# Patient Record
Sex: MALE | Race: WHITE | NOT HISPANIC OR LATINO | Employment: FULL TIME | URBAN - METROPOLITAN AREA
[De-identification: names, ages, dates, MRNs, and addresses within clinical notes are randomized per-mention and may not be internally consistent; named-entity substitution may affect disease eponyms.]

---

## 2023-03-06 ENCOUNTER — TELEPHONE (OUTPATIENT)
Dept: PRIMARY CARE | Facility: CLINIC | Age: 53
End: 2023-03-06
Payer: COMMERCIAL

## 2023-03-06 DIAGNOSIS — J32.9 SINUSITIS, UNSPECIFIED CHRONICITY, UNSPECIFIED LOCATION: ICD-10-CM

## 2023-03-06 RX ORDER — CEFUROXIME AXETIL 250 MG/1
250 TABLET ORAL 2 TIMES DAILY
Qty: 20 TABLET | Refills: 0 | Status: SHIPPED | OUTPATIENT
Start: 2023-03-06 | End: 2023-03-16

## 2023-03-06 NOTE — TELEPHONE ENCOUNTER
PATIENT IS CALLING - LAST SAW YOU ON 2/11/23 - FOR A SINUS INFECTION - IS EXPERIENCING THE SAME SYMPTOMS AGAIN, CONGESTION IS WORSE THIS TIME - WONDERING IF YOU COULD PRESCRIBE HIM THE SAME MEDS AGAIN?  NEED AN APPOINTMENT?  PLEASE ADVISE.    LAST HAD CEFUROXIME AXETIL 250 MG - 1 TABLET EVERY 12 HOURS    [facility] IN [place]

## 2023-04-06 DIAGNOSIS — F32.A DEPRESSION, UNSPECIFIED: ICD-10-CM

## 2023-04-06 DIAGNOSIS — F41.9 ANXIETY DISORDER, UNSPECIFIED: ICD-10-CM

## 2023-04-06 RX ORDER — VENLAFAXINE HYDROCHLORIDE 150 MG/1
CAPSULE, EXTENDED RELEASE ORAL
Qty: 90 CAPSULE | Refills: 0 | Status: SHIPPED | OUTPATIENT
Start: 2023-04-06 | End: 2023-07-05

## 2023-05-05 DIAGNOSIS — E78.5 HYPERLIPIDEMIA, UNSPECIFIED: ICD-10-CM

## 2023-05-05 RX ORDER — ROSUVASTATIN CALCIUM 10 MG/1
TABLET, COATED ORAL
Qty: 90 TABLET | Refills: 0 | Status: SHIPPED | OUTPATIENT
Start: 2023-05-05 | End: 2023-08-03

## 2023-05-23 ENCOUNTER — OFFICE VISIT (OUTPATIENT)
Dept: PRIMARY CARE | Facility: CLINIC | Age: 53
End: 2023-05-23
Payer: COMMERCIAL

## 2023-05-23 VITALS
HEART RATE: 79 BPM | DIASTOLIC BLOOD PRESSURE: 82 MMHG | SYSTOLIC BLOOD PRESSURE: 122 MMHG | OXYGEN SATURATION: 97 % | HEIGHT: 70 IN | BODY MASS INDEX: 29.89 KG/M2 | WEIGHT: 208.8 LBS | TEMPERATURE: 98 F

## 2023-05-23 DIAGNOSIS — Z12.5 PROSTATE CANCER SCREENING: ICD-10-CM

## 2023-05-23 DIAGNOSIS — R53.83 FATIGUE, UNSPECIFIED TYPE: ICD-10-CM

## 2023-05-23 DIAGNOSIS — E78.5 HYPERLIPIDEMIA, UNSPECIFIED HYPERLIPIDEMIA TYPE: ICD-10-CM

## 2023-05-23 DIAGNOSIS — R06.83 SNORING: ICD-10-CM

## 2023-05-23 DIAGNOSIS — Z00.00 ROUTINE GENERAL MEDICAL EXAMINATION AT A HEALTH CARE FACILITY: Primary | ICD-10-CM

## 2023-05-23 DIAGNOSIS — M65.352 TRIGGER LITTLE FINGER OF LEFT HAND: ICD-10-CM

## 2023-05-23 PROBLEM — F32.A ANXIETY AND DEPRESSION: Status: ACTIVE | Noted: 2023-05-23

## 2023-05-23 PROBLEM — L72.3 SEBACEOUS CYST: Status: ACTIVE | Noted: 2023-05-23

## 2023-05-23 PROBLEM — M79.601 PAIN OF RIGHT UPPER EXTREMITY: Status: ACTIVE | Noted: 2023-05-23

## 2023-05-23 PROBLEM — M77.8 TENDINITIS OF ELBOW: Status: ACTIVE | Noted: 2023-05-23

## 2023-05-23 PROBLEM — Z85.820 HISTORY OF MELANOMA: Status: ACTIVE | Noted: 2023-05-23

## 2023-05-23 PROBLEM — R22.2 LUMP OF SKIN OF BACK: Status: ACTIVE | Noted: 2023-05-23

## 2023-05-23 PROBLEM — M76.60 ACHILLES TENDON PAIN: Status: ACTIVE | Noted: 2023-05-23

## 2023-05-23 PROBLEM — M77.9 TENDONITIS: Status: ACTIVE | Noted: 2023-05-23

## 2023-05-23 PROBLEM — M77.50 TENDINITIS OF ANKLE: Status: ACTIVE | Noted: 2023-05-23

## 2023-05-23 PROBLEM — F41.9 ANXIETY AND DEPRESSION: Status: ACTIVE | Noted: 2023-05-23

## 2023-05-23 PROCEDURE — 99396 PREV VISIT EST AGE 40-64: CPT | Performed by: FAMILY MEDICINE

## 2023-05-23 PROCEDURE — 1036F TOBACCO NON-USER: CPT | Performed by: FAMILY MEDICINE

## 2023-05-23 RX ORDER — TADALAFIL 20 MG/1
TABLET ORAL
COMMUNITY
Start: 2023-02-11 | End: 2024-01-15

## 2023-05-23 RX ORDER — DICLOFENAC SODIUM 75 MG/1
75 TABLET, DELAYED RELEASE ORAL 2 TIMES DAILY PRN
Qty: 60 TABLET | Refills: 3 | Status: SHIPPED | OUTPATIENT
Start: 2023-05-23 | End: 2023-11-22

## 2023-05-23 RX ORDER — BUSPIRONE HYDROCHLORIDE 10 MG/1
10 TABLET ORAL DAILY
COMMUNITY
Start: 2022-01-17 | End: 2023-11-09

## 2023-05-23 NOTE — PROGRESS NOTES
"Subjective   Patient ID: Alphonso King is a 52 y.o. male who presents for Snoring and Joint Pain.  HPI  Snoring   Worsening over the past 2-3 months  Admits to fatigue   No feelings of drowsiness when driving   Has a family h/o sleep apnea     Joint pain   Left fingers x 3 weeks   Described as an aching   Also admits to pain in the left foot   No other joint pain noted   Denies a specific injury  No otc meds taken                ROS  Constitutional- No activity change. No appetite change.  Eyes- Denies vision changes.  Respiratory- No shortness of breath.  Cardiovascular- No palpitations. No chest pain.  GI- No nausea or vomiting. No diarrhea or constipation. Denies abdominal pain.  Musculoskeletal- Denies joint swelling.  Extremities- No edema.  Neurological- Denies headaches. Denies dizziness.  Skin- No rashes.  Psychiatric/Behavioral- Denies significant anxiety, or depressed mood.     Objective     /82   Pulse 79   Temp 36.7 °C (98 °F) (Oral)   Ht 1.778 m (5' 10\")   Wt 94.7 kg (208 lb 12.8 oz)   SpO2 97%   BMI 29.96 kg/m²     No Known Allergies    Constitutional-- Well-nourished.  No distress  Head- unremarkable.  Ears- TMs clear.  Eyes- PERRL.  Conjunctiva normal.  Nose- Normal.  No rhinorrhea noted.  Throat- Oropharynx is clear and moist.  Neck- Supple with no thyromegaly.  No significant cervical adenopathy noted.  Pulmonary/Chest- Breath sounds normal with normal effort.  No wheezing.  Heart- Regular rate and rhythm.  No murmur.  Abdomen- Soft and non-tender.  No masses noted.  Musculoskeletal- Normal ROM.  No significant joint swelling  Extremities- No edema.   Neurological- Alert.  No noted deficits.  Skin- Warm.  No rashes.  Psychiatric/Behavioral- Mood and affect normal.  Behavior normal.     Assessment/Plan   1. Routine general medical examination at a health care facility  CBC and Auto Differential    Comprehensive Metabolic Panel    Lipid Panel      2. Hyperlipidemia, unspecified " hyperlipidemia type  Lipid Panel      3. Prostate cancer screening  Prostate Specific Antigen, Screen      4. Snoring  Home sleep apnea test (HSAT)      5. Fatigue, unspecified type  Home sleep apnea test (HSAT)      6. Trigger little finger of left hand  diclofenac (Voltaren) 75 mg EC tablet         Long talk. Treatment options reviewed.  Mood stable  BP controlled  Cholesterol moderately controlled    Snoring:  Ordered home based sleep study  Labs as ordered     Finger pain likely trigger finger vs arthritis:  Start diclofenac 75 mg as needed.  Start over the counter pain controled medications as needed for pain relief     Refilled medications as requested/required   Continue and take your medications as prescribed.  Take the least amount of medication required for symptom control     Health Maintenance issues discussed.    Importance of healthy diet and regular exercise regimen discussed.    We will contact you with any test results ordered. If you do not hear from us, please contact.    Follow-up as instructed or sooner if any problems or symptoms do not resolve as expected.    Scribe Attestation  By signing my name below, HAMILTON ElioKori Carballo   attest that this documentation has been prepared under the direction and in the presence of Tae Aguilar MD.

## 2023-07-04 DIAGNOSIS — F32.A DEPRESSION, UNSPECIFIED: ICD-10-CM

## 2023-07-04 DIAGNOSIS — F41.9 ANXIETY DISORDER, UNSPECIFIED: ICD-10-CM

## 2023-07-05 RX ORDER — VENLAFAXINE HYDROCHLORIDE 150 MG/1
CAPSULE, EXTENDED RELEASE ORAL
Qty: 90 CAPSULE | Refills: 1 | Status: SHIPPED | OUTPATIENT
Start: 2023-07-05 | End: 2024-01-02

## 2023-08-03 DIAGNOSIS — E78.5 HYPERLIPIDEMIA, UNSPECIFIED: ICD-10-CM

## 2023-08-03 RX ORDER — ROSUVASTATIN CALCIUM 10 MG/1
TABLET, COATED ORAL
Qty: 90 TABLET | Refills: 0 | Status: SHIPPED | OUTPATIENT
Start: 2023-08-03 | End: 2023-11-06

## 2023-08-08 ENCOUNTER — APPOINTMENT (OUTPATIENT)
Dept: PRIMARY CARE | Facility: CLINIC | Age: 53
End: 2023-08-08
Payer: COMMERCIAL

## 2023-08-16 ENCOUNTER — APPOINTMENT (OUTPATIENT)
Dept: PRIMARY CARE | Facility: CLINIC | Age: 53
End: 2023-08-16
Payer: COMMERCIAL

## 2023-10-23 ENCOUNTER — OFFICE VISIT (OUTPATIENT)
Dept: PRIMARY CARE | Facility: CLINIC | Age: 53
End: 2023-10-23
Payer: COMMERCIAL

## 2023-10-23 VITALS
SYSTOLIC BLOOD PRESSURE: 130 MMHG | HEART RATE: 80 BPM | RESPIRATION RATE: 18 BRPM | BODY MASS INDEX: 30.29 KG/M2 | WEIGHT: 211.6 LBS | HEIGHT: 70 IN | OXYGEN SATURATION: 98 % | TEMPERATURE: 97.8 F | DIASTOLIC BLOOD PRESSURE: 90 MMHG

## 2023-10-23 DIAGNOSIS — R61 PERSPIRATION EXCESSIVE: ICD-10-CM

## 2023-10-23 DIAGNOSIS — R06.02 SOB (SHORTNESS OF BREATH) ON EXERTION: Primary | ICD-10-CM

## 2023-10-23 DIAGNOSIS — E55.9 VITAMIN D DEFICIENCY: ICD-10-CM

## 2023-10-23 DIAGNOSIS — F32.A ANXIETY AND DEPRESSION: ICD-10-CM

## 2023-10-23 DIAGNOSIS — E78.5 HYPERLIPIDEMIA, UNSPECIFIED HYPERLIPIDEMIA TYPE: ICD-10-CM

## 2023-10-23 DIAGNOSIS — F41.9 ANXIETY AND DEPRESSION: ICD-10-CM

## 2023-10-23 PROCEDURE — 99213 OFFICE O/P EST LOW 20 MIN: CPT | Performed by: FAMILY MEDICINE

## 2023-10-23 PROCEDURE — 93000 ELECTROCARDIOGRAM COMPLETE: CPT | Performed by: FAMILY MEDICINE

## 2023-10-23 PROCEDURE — 1036F TOBACCO NON-USER: CPT | Performed by: FAMILY MEDICINE

## 2023-10-23 NOTE — PROGRESS NOTES
Subjective   Patient ID: Alphonso King is a 53 y.o. male who presents for Excessive Sweating, Shortness of Breath, and memory issues.  HPI    Hyperlipidemia follow up  Denies chest pain, swelling, headaches, lightheadedness or dizziness.   Eats a generally healthy diet, Exercises.   Currently taking rosuvastatin    Anxiety and depression follow up  Taking the Effexor   Working well   50 % effective   Denies any side effects   Last took this morning    Patient presents in office today for SOB. admits that this happens when he is exercising. Admits that he finds it hard to catch his breath. Ongoing x 1 year. Admits that he is an ex smoker. Admits that he feels like he is running out of energy faster when this happens.     Also presents in office today for excessive sweating. Ongoing for years. Admits that his mother and his sister have this issues as well. Admits that this is worse in the morning. admits that his hair will be soaked as if he just got out of the shower.     Also presents in office today for memory issues. Admits that he has been having issues with remembering names, admits that this happened with a  he had yesterday for his child. Admits that he did forget her name, but this was the first time that he had this . Admits to a family history of Dementia, alzheimer's.     Taking current medications which were reviewed.  Problem list discussed.    Overall doing well.  Eating okay.  Staying active.    Has no other new problem /question.     ROS  Constitutional- No activity change. No appetite change.  Eyes- Denies vision changes.  Respiratory- shortness of breath  Cardiovascular- No palpitations. No chest pain.  GI- No nausea or vomiting. No diarrhea or constipation. Denies abdominal pain.  Musculoskeletal- Denies joint swelling.  Extremities- No edema.  Neurological- Denies headaches. Denies dizziness.  Skin- No rashes.  Psychiatric/Behavioral- Denies significant anxiety, or depressed  "mood.     Objective     /90   Pulse 80   Temp 36.6 °C (97.8 °F) (Temporal)   Resp 18   Ht 1.778 m (5' 10\")   Wt 96 kg (211 lb 9.6 oz)   SpO2 98%   BMI 30.36 kg/m²     No Known Allergies    Constitutional-- Well-nourished.  No distress  Head- unremarkable.  Eyes- PERRL.  Conjunctiva normal.  Nose- Normal.  No rhinorrhea noted.  Throat- Oropharynx is clear and moist.  Neck- Supple with no thyromegaly.  No significant cervical adenopathy noted.  Pulmonary/Chest- Breath sounds normal with normal effort.  No wheezing.  Heart- Regular rate and rhythm.  No murmur.  Abdomen- Soft and non-tender.  No masses noted.  Musculoskeletal- Normal ROM.  No significant joint swelling  Extremities- No edema.   Neurological- Alert.  No noted deficits.  Mini-Mental status exam within normal limits  Skin- Warm.  No rashes.  Psychiatric/Behavioral- Mood and affect normal.  Behavior normal.       EKG normal sinus rhythm with no acute changes    Assessment/Plan   1. SOB (shortness of breath) on exertion  Thyroid Stimulating Hormone    Thyroxine, Free    Vitamin D 25-Hydroxy,Total (for eval of Vitamin D levels)    ECG 12 Lead    Referral to Cardiology      2. Anxiety and depression  Thyroid Stimulating Hormone    Thyroxine, Free    Vitamin D 25-Hydroxy,Total (for eval of Vitamin D levels)      3. Perspiration excessive        4. Hyperlipidemia, unspecified hyperlipidemia type        5. Vitamin D deficiency  Vitamin D 25-Hydroxy,Total (for eval of Vitamin D levels)             Long talk. Treatment options reviewed.    Discussed shortness of breath.  Discussed excessive perspiration.    Given his concern with shortness of breath on exertion will refer to cardiology for evaluation and work-up.    Continue current medication. I have counseled the patient on anxiety and depression. Denies suicidal ideation or homicidal ideation.     Educated on vitamin deficiencies.      Continue and take your medications as prescribed.    Health " Maintenance issues discussed.    Importance of healthy diet and regular exercise regimen discussed.    We will contact you with any test results ordered. If you do not hear from us, please contact.    Follow-up as instructed or sooner if any problems or symptoms do not resolve as expected.    Scribe Attestation  By signing my name below, Nubia VILLASENOR Scribe   attest that this documentation has been prepared under the direction and in the presence of Tae Aguilar MD.

## 2023-10-31 ENCOUNTER — HOSPITAL ENCOUNTER (EMERGENCY)
Facility: HOSPITAL | Age: 53
Discharge: HOME | End: 2023-10-31
Attending: EMERGENCY MEDICINE
Payer: COMMERCIAL

## 2023-10-31 ENCOUNTER — APPOINTMENT (OUTPATIENT)
Dept: RADIOLOGY | Facility: HOSPITAL | Age: 53
End: 2023-10-31
Payer: COMMERCIAL

## 2023-10-31 VITALS
OXYGEN SATURATION: 98 % | DIASTOLIC BLOOD PRESSURE: 112 MMHG | BODY MASS INDEX: 30.35 KG/M2 | RESPIRATION RATE: 16 BRPM | HEART RATE: 78 BPM | WEIGHT: 212 LBS | SYSTOLIC BLOOD PRESSURE: 155 MMHG | TEMPERATURE: 97.9 F | HEIGHT: 70 IN

## 2023-10-31 DIAGNOSIS — R07.9 CHEST PAIN, UNSPECIFIED TYPE: Primary | ICD-10-CM

## 2023-10-31 LAB
ALBUMIN SERPL BCP-MCNC: 4.6 G/DL (ref 3.4–5)
ALP SERPL-CCNC: 39 U/L (ref 33–120)
ALT SERPL W P-5'-P-CCNC: 43 U/L (ref 10–52)
ANION GAP SERPL CALC-SCNC: 13 MMOL/L (ref 10–20)
AST SERPL W P-5'-P-CCNC: 26 U/L (ref 9–39)
BASOPHILS # BLD AUTO: 0.06 X10*3/UL (ref 0–0.1)
BASOPHILS NFR BLD AUTO: 0.8 %
BILIRUB SERPL-MCNC: 0.4 MG/DL (ref 0–1.2)
BUN SERPL-MCNC: 18 MG/DL (ref 6–23)
CALCIUM SERPL-MCNC: 9.5 MG/DL (ref 8.6–10.3)
CARDIAC TROPONIN I PNL SERPL HS: 4 NG/L (ref 0–20)
CARDIAC TROPONIN I PNL SERPL HS: 5 NG/L (ref 0–20)
CHLORIDE SERPL-SCNC: 103 MMOL/L (ref 98–107)
CO2 SERPL-SCNC: 26 MMOL/L (ref 21–32)
CREAT SERPL-MCNC: 1.13 MG/DL (ref 0.5–1.3)
D DIMER PPP FEU-MCNC: <215 NG/ML FEU
EOSINOPHIL # BLD AUTO: 0.14 X10*3/UL (ref 0–0.7)
EOSINOPHIL NFR BLD AUTO: 1.9 %
ERYTHROCYTE [DISTWIDTH] IN BLOOD BY AUTOMATED COUNT: 13.4 % (ref 11.5–14.5)
GFR SERPL CREATININE-BSD FRML MDRD: 78 ML/MIN/1.73M*2
GLUCOSE SERPL-MCNC: 119 MG/DL (ref 74–99)
HCT VFR BLD AUTO: 46.2 % (ref 41–52)
HGB BLD-MCNC: 15.1 G/DL (ref 13.5–17.5)
IMM GRANULOCYTES # BLD AUTO: 0.02 X10*3/UL (ref 0–0.7)
IMM GRANULOCYTES NFR BLD AUTO: 0.3 % (ref 0–0.9)
LYMPHOCYTES # BLD AUTO: 1.98 X10*3/UL (ref 1.2–4.8)
LYMPHOCYTES NFR BLD AUTO: 26.9 %
MAGNESIUM SERPL-MCNC: 2.34 MG/DL (ref 1.6–2.4)
MCH RBC QN AUTO: 29.3 PG (ref 26–34)
MCHC RBC AUTO-ENTMCNC: 32.7 G/DL (ref 32–36)
MCV RBC AUTO: 90 FL (ref 80–100)
MONOCYTES # BLD AUTO: 0.41 X10*3/UL (ref 0.1–1)
MONOCYTES NFR BLD AUTO: 5.6 %
NEUTROPHILS # BLD AUTO: 4.75 X10*3/UL (ref 1.2–7.7)
NEUTROPHILS NFR BLD AUTO: 64.5 %
NRBC BLD-RTO: 0 /100 WBCS (ref 0–0)
PLATELET # BLD AUTO: 221 X10*3/UL (ref 150–450)
PMV BLD AUTO: 9.2 FL (ref 7.5–11.5)
POTASSIUM SERPL-SCNC: 3.9 MMOL/L (ref 3.5–5.3)
PROT SERPL-MCNC: 7.3 G/DL (ref 6.4–8.2)
RBC # BLD AUTO: 5.16 X10*6/UL (ref 4.5–5.9)
SODIUM SERPL-SCNC: 138 MMOL/L (ref 136–145)
WBC # BLD AUTO: 7.4 X10*3/UL (ref 4.4–11.3)

## 2023-10-31 PROCEDURE — 99284 EMERGENCY DEPT VISIT MOD MDM: CPT | Mod: 25 | Performed by: EMERGENCY MEDICINE

## 2023-10-31 PROCEDURE — 71046 X-RAY EXAM CHEST 2 VIEWS: CPT | Performed by: RADIOLOGY

## 2023-10-31 PROCEDURE — 99285 EMERGENCY DEPT VISIT HI MDM: CPT | Performed by: EMERGENCY MEDICINE

## 2023-10-31 PROCEDURE — 80053 COMPREHEN METABOLIC PANEL: CPT | Performed by: EMERGENCY MEDICINE

## 2023-10-31 PROCEDURE — 36415 COLL VENOUS BLD VENIPUNCTURE: CPT | Performed by: EMERGENCY MEDICINE

## 2023-10-31 PROCEDURE — 84484 ASSAY OF TROPONIN QUANT: CPT | Performed by: EMERGENCY MEDICINE

## 2023-10-31 PROCEDURE — 85025 COMPLETE CBC W/AUTO DIFF WBC: CPT | Performed by: EMERGENCY MEDICINE

## 2023-10-31 PROCEDURE — 93010 ELECTROCARDIOGRAM REPORT: CPT | Performed by: EMERGENCY MEDICINE

## 2023-10-31 PROCEDURE — 83735 ASSAY OF MAGNESIUM: CPT | Performed by: EMERGENCY MEDICINE

## 2023-10-31 PROCEDURE — 99283 EMERGENCY DEPT VISIT LOW MDM: CPT | Mod: 25

## 2023-10-31 PROCEDURE — 94760 N-INVAS EAR/PLS OXIMETRY 1: CPT

## 2023-10-31 PROCEDURE — 71046 X-RAY EXAM CHEST 2 VIEWS: CPT | Mod: FY

## 2023-10-31 PROCEDURE — 85379 FIBRIN DEGRADATION QUANT: CPT | Performed by: EMERGENCY MEDICINE

## 2023-10-31 ASSESSMENT — HEART SCORE
HISTORY: SLIGHTLY SUSPICIOUS
AGE: 45-64
ECG: NORMAL
RISK FACTORS: >2 RISK FACTORS OR HX OF ATHEROSCLEROTIC DISEASE
TROPONIN: LESS THAN OR EQUAL TO NORMAL LIMIT
HEART SCORE: 3

## 2023-10-31 ASSESSMENT — COLUMBIA-SUICIDE SEVERITY RATING SCALE - C-SSRS
2. HAVE YOU ACTUALLY HAD ANY THOUGHTS OF KILLING YOURSELF?: NO
6. HAVE YOU EVER DONE ANYTHING, STARTED TO DO ANYTHING, OR PREPARED TO DO ANYTHING TO END YOUR LIFE?: NO
1. IN THE PAST MONTH, HAVE YOU WISHED YOU WERE DEAD OR WISHED YOU COULD GO TO SLEEP AND NOT WAKE UP?: NO

## 2023-10-31 ASSESSMENT — PAIN DESCRIPTION - DESCRIPTORS: DESCRIPTORS: ACHING;DISCOMFORT

## 2023-10-31 ASSESSMENT — LIFESTYLE VARIABLES
HAVE YOU EVER FELT YOU SHOULD CUT DOWN ON YOUR DRINKING: NO
REASON UNABLE TO ASSESS: NO
EVER HAD A DRINK FIRST THING IN THE MORNING TO STEADY YOUR NERVES TO GET RID OF A HANGOVER: NO
EVER FELT BAD OR GUILTY ABOUT YOUR DRINKING: NO
HAVE PEOPLE ANNOYED YOU BY CRITICIZING YOUR DRINKING: NO

## 2023-10-31 ASSESSMENT — PAIN SCALES - GENERAL
PAINLEVEL_OUTOF10: 4
PAINLEVEL_OUTOF10: 0 - NO PAIN
PAINLEVEL_OUTOF10: 1

## 2023-10-31 ASSESSMENT — PAIN - FUNCTIONAL ASSESSMENT: PAIN_FUNCTIONAL_ASSESSMENT: 0-10

## 2023-10-31 NOTE — ED PROVIDER NOTES
HPI   Chief Complaint   Patient presents with    Chest Pain       53-year-old male with past medical history of hyperlipidemia, anxiety and depression presenting to the ED for chest discomfort.  Patient states he woke around 4 AM with a pressure sensation but was able to fall back asleep.  He woke again a few hours later and was still experiencing chest pressure that is nonradiating.  Denies shortness of breath, nausea, vomiting, diaphoresis, leg pain or swelling, fevers, cough.  He endorses nonbloody diarrhea with a couple episodes since yesterday.  He also endorses recent long distance car travel about 2 weeks prior.  Patient states he is currently experiencing stressors at home with his marriage and had an argument last night.      History provided by:  Patient                      [place] Coma Scale Score: 15   HEART Score: 3                Patient History   Past Medical History:   Diagnosis Date    Encounter for general adult medical examination without abnormal findings 01/17/2022    Annual physical exam    Encounter for screening for malignant neoplasm of prostate 01/17/2022    Screening PSA (prostate specific antigen)    Personal history of malignant melanoma of skin     History of malignant melanoma of skin     Past Surgical History:   Procedure Laterality Date    OTHER SURGICAL HISTORY  01/17/2022    Eye surgery     Family History   Problem Relation Name Age of Onset    Skin cancer Maternal Grandmother      Heart attack Paternal Grandfather       Social History     Tobacco Use    Smoking status: Former     Types: Cigarettes    Smokeless tobacco: Never   Substance Use Topics    Alcohol use: Yes     Comment: 2 x per week    Drug use: Not on file       Physical Exam   ED Triage Vitals [10/31/23 1251]   Temp Heart Rate Resp BP   36.6 °C (97.9 °F) 91 18 (!) 169/98      SpO2 Temp Source Heart Rate Source Patient Position   99 % Temporal Monitor Sitting      BP Location FiO2 (%)     Right arm --       Physical  Exam  Vitals and nursing note reviewed.   Constitutional:       General: He is not in acute distress.     Appearance: He is not toxic-appearing.   HENT:      Head: Normocephalic and atraumatic.      Nose: Nose normal.      Mouth/Throat:      Mouth: Mucous membranes are moist.   Eyes:      Extraocular Movements: Extraocular movements intact.      Conjunctiva/sclera: Conjunctivae normal.   Cardiovascular:      Rate and Rhythm: Normal rate and regular rhythm.      Pulses: Normal pulses.           Radial pulses are 2+ on the right side and 2+ on the left side.        Dorsalis pedis pulses are 2+ on the right side and 2+ on the left side.      Heart sounds: Normal heart sounds.   Pulmonary:      Effort: Pulmonary effort is normal.      Breath sounds: Normal breath sounds.   Abdominal:      General: There is no distension.      Palpations: Abdomen is soft.      Tenderness: There is no abdominal tenderness.   Musculoskeletal:         General: Normal range of motion.      Cervical back: Normal range of motion and neck supple.      Right lower leg: No edema.      Left lower leg: No edema.   Skin:     General: Skin is warm and dry.      Capillary Refill: Capillary refill takes less than 2 seconds.   Neurological:      General: No focal deficit present.      Mental Status: He is alert. Mental status is at baseline.         ED Course & MDM   Diagnoses as of 10/31/23 1641   Chest pain, unspecified type       Medical Decision Making  53-year-old male the past medical history of hyperlipidemia, anxiety and depression presenting to the ED for chest pain that started today without other associated symptoms.  Patient is nontoxic-appearing on exam.  He has equal pulses in all extremities.    Cardiac labs were obtained with normal cardiac enzymes x2 and nonischemic EKG.  D-dimer was negative therefore low suspicion for PE.  Normal electrolytes and renal function.  Chest x-ray without consolidations, widened mediastinum or  cardiomegaly.    Patient has an appointment to see cardiology and will follow-up at that time.  Heart score is 3.  Believe he is appropriate to be discharged home and was comfortable with this plan.  He will return to the ED for any worsening symptoms.    Amount and/or Complexity of Data Reviewed  Labs: ordered. Decision-making details documented in ED Course.  Radiology: ordered and independent interpretation performed. Decision-making details documented in ED Course.  ECG/medicine tests: ordered and independent interpretation performed. Decision-making details documented in ED Course.     Details: Normal sinus rhythm with rate of 71.  QTc 473.  Normal axis.  No acute ischemic pattern.        Procedure  Procedures     Don Roblero DO  Resident  10/31/23 3468

## 2023-10-31 NOTE — DISCHARGE INSTRUCTIONS
Follow-up with your primary care physician and your cardiologist as scheduled.  If you have worsening symptoms please return to the ER for evaluation.

## 2023-11-01 ENCOUNTER — HOSPITAL ENCOUNTER (OUTPATIENT)
Dept: CARDIOLOGY | Facility: HOSPITAL | Age: 53
Discharge: HOME | End: 2023-11-01
Payer: COMMERCIAL

## 2023-11-01 LAB
ATRIAL RATE: 71 BPM
P AXIS: 62 DEGREES
P OFFSET: 199 MS
P ONSET: 141 MS
PR INTERVAL: 162 MS
Q ONSET: 222 MS
QRS COUNT: 12 BEATS
QRS DURATION: 94 MS
QT INTERVAL: 436 MS
QTC CALCULATION(BAZETT): 473 MS
QTC FREDERICIA: 461 MS
R AXIS: 29 DEGREES
T AXIS: 59 DEGREES
T OFFSET: 440 MS
VENTRICULAR RATE: 71 BPM

## 2023-11-01 PROCEDURE — 93005 ELECTROCARDIOGRAM TRACING: CPT

## 2023-11-05 DIAGNOSIS — E78.5 HYPERLIPIDEMIA, UNSPECIFIED: ICD-10-CM

## 2023-11-06 RX ORDER — ROSUVASTATIN CALCIUM 10 MG/1
TABLET, COATED ORAL
Qty: 90 TABLET | Refills: 1 | Status: SHIPPED | OUTPATIENT
Start: 2023-11-06 | End: 2023-11-22 | Stop reason: DRUGHIGH

## 2023-11-22 ENCOUNTER — OFFICE VISIT (OUTPATIENT)
Dept: CARDIOLOGY | Facility: CLINIC | Age: 53
End: 2023-11-22
Payer: COMMERCIAL

## 2023-11-22 VITALS
SYSTOLIC BLOOD PRESSURE: 155 MMHG | WEIGHT: 210 LBS | HEIGHT: 70 IN | DIASTOLIC BLOOD PRESSURE: 107 MMHG | HEART RATE: 49 BPM | BODY MASS INDEX: 30.06 KG/M2

## 2023-11-22 DIAGNOSIS — E78.2 MIXED HYPERLIPIDEMIA: Primary | ICD-10-CM

## 2023-11-22 DIAGNOSIS — F32.A ANXIETY AND DEPRESSION: ICD-10-CM

## 2023-11-22 DIAGNOSIS — Z82.49 FAMILY HISTORY OF PREMATURE CORONARY ARTERY DISEASE: ICD-10-CM

## 2023-11-22 DIAGNOSIS — R07.9 CHEST PAIN, UNSPECIFIED TYPE: ICD-10-CM

## 2023-11-22 DIAGNOSIS — R06.02 SOB (SHORTNESS OF BREATH) ON EXERTION: ICD-10-CM

## 2023-11-22 DIAGNOSIS — F41.9 ANXIETY AND DEPRESSION: ICD-10-CM

## 2023-11-22 PROBLEM — R07.89 OTHER CHEST PAIN: Status: ACTIVE | Noted: 2023-11-22

## 2023-11-22 PROCEDURE — 99204 OFFICE O/P NEW MOD 45 MIN: CPT | Performed by: INTERNAL MEDICINE

## 2023-11-22 PROCEDURE — 1036F TOBACCO NON-USER: CPT | Performed by: INTERNAL MEDICINE

## 2023-11-22 RX ORDER — ASPIRIN 81 MG/1
81 TABLET ORAL DAILY
Qty: 90 TABLET | Refills: 3 | Status: SHIPPED | OUTPATIENT
Start: 2023-11-22 | End: 2024-11-21

## 2023-11-22 RX ORDER — DICLOFENAC SODIUM 75 MG/1
75 TABLET, DELAYED RELEASE ORAL NIGHTLY PRN
COMMUNITY
End: 2023-11-29 | Stop reason: SDUPTHER

## 2023-11-22 RX ORDER — ROSUVASTATIN CALCIUM 40 MG/1
40 TABLET, COATED ORAL DAILY
Qty: 30 TABLET | Refills: 11 | Status: SHIPPED | OUTPATIENT
Start: 2023-11-22 | End: 2024-01-15 | Stop reason: SDUPTHER

## 2023-11-22 RX ORDER — NITROGLYCERIN 0.4 MG/1
0.4 TABLET SUBLINGUAL EVERY 5 MIN PRN
Qty: 100 TABLET | Refills: 11 | Status: CANCELLED | OUTPATIENT
Start: 2023-11-22

## 2023-11-22 NOTE — PROGRESS NOTES
CARDIOLOGY NEW PATIENT OFFICE VISIT    Patient:  Alphonso King  YOB: 1970  MRN: [id number]       History of Present Illness:     Alphonso King is a 53 y.o. male who is being seen today at the request of Dr. Tae Aguilar for evaluation of cardiac status in the setting of chest discomfort.  He does not have any history of atherosclerotic heart or valvular heart disease.  He has a history of significant mixed hyperlipidemia.  No history of hypertension or diabetes mellitus.  He stopped smoking tobacco several years ago.  He relates a strongly positive family history for premature coronary artery disease.  His father had a myocardial infarction in his 40s.  His paternal grandparents also had myocardial infarctions at relatively young ages.      Patient states he was doing well until the morning of October 31, 2023.  He was awakened at AM with a strong pressure-like discomfort in his chest.  After several minutes he was able to fall back asleep.  A few hours later he woke up and still noted nonradiating pressure discomfort in his left upper chest.  No associated nausea, vomiting, diaphoresis, or shortness of breath.  The patient notes that he had been under quite a bit of stress at home.  He and his wife had an argument the evening before the ED visit.  His symptoms resolved spontaneously.  Cardiac enzymes were negative.  D-dimer was negative.  CBC and CMP were normal.  Cholesterol was 263 with triglycerides 430, and HDL 41.  LDL unable to be calculated.  EKG did not show any significant ST changes.  Chest x-ray did not show any active disease.  He was discharged home with plans to follow-up with cardiology.      He has been doing reasonably well since his discharge.  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  He admittedly has quite a bit of stress and anxiety.  He denies any recurrent episodes of chest discomfort.  He notes some concern because of some gradual worsening exertional  shortness of breath.  He denies any orthopnea, PND, or increasing peripheral edema.  He denies any palpitations, lightheadedness, near-syncope, or syncope.  He denies any fever, chills, or cough.  He denies any nausea, vomiting, or diaphoresis.  He denies any hemoptysis, hematemesis, melena, or hematochezia.    I spoke at length with Alphonso regarding his symptoms of chest discomfort.  He has significant risk for obstructive coronary disease secondary to quite severe mixed hyperlipidemia and strongly positive family history for premature coronary disease.  I suggested we proceed with a coronary CT angiogram in the near future.  Further recommendations pending these results.  I advised him to increase rosuvastatin to 40 mg daily.  He was advised for now to start on aspirin 81 mg daily.  Would avoid SL NTG in the setting of using sildenafil.        Allergies:     No Known Allergies       Outpatient Medications:     Current Outpatient Medications   Medication Instructions    busPIRone (Buspar) 10 mg tablet TAKE 1-2 TABLETS BY MOUTH DAILY    diclofenac (VOLTAREN) 75 mg, oral, Nightly PRN, Do not crush, chew, or split.    rosuvastatin (Crestor) 10 mg tablet TAKE 1 TABLET BY MOUTH EVERYDAY AT BEDTIME    tadalafil 20 mg tablet TAKE 1 TABLET BY MOUTH 1 HOUR BEFORE ACTIVITY AS NEEDED    venlafaxine XR (Effexor-XR) 150 mg 24 hr capsule TAKE 1 CAPSULE BY MOUTH EVERY DAY        Past Medical History:     Past Medical History:   Diagnosis Date    Encounter for general adult medical examination without abnormal findings 01/17/2022    Annual physical exam    Encounter for screening for malignant neoplasm of prostate 01/17/2022    Screening PSA (prostate specific antigen)    Personal history of malignant melanoma of skin     History of malignant melanoma of skin       Social History:     Social History     Tobacco Use    Smoking status: Former     Types: Cigarettes    Smokeless tobacco: Never   Substance Use Topics    Alcohol use: Yes      Comment: 2 x per week       Family History:     Family History   Problem Relation Name Age of Onset    Skin cancer Maternal Grandmother      Heart attack Paternal Grandfather         Review of Systems:     12 point review of systems completed and is negative other than that detailed above.       Objective:     Vitals:    11/22/23 1433   BP: (!) 155/107   Pulse: (!) 49       Wt Readings from Last 4 Encounters:   11/22/23 95.3 kg (210 lb)   10/31/23 96.2 kg (212 lb)   10/23/23 96 kg (211 lb 9.6 oz)   05/23/23 94.7 kg (208 lb 12.8 oz)       Physical Examination:   GENERAL:  Well developed, well nourished, in no acute distress.  CHEST:  Symmetric and nontender.  NEURO/PSYCH:  Alert and oriented times three with approppriate behavior and responses.  NECK:  Supple, no JVD, no bruit.  LUNGS:  Clear to auscultation bilaterally, normal respiratory effort.  HEART:  Rate and rhythm regular with no evident murmur, no gallop appreciated.        There are no rubs, clicks or heaves.  EXTREMITIES:  Warm with good color, no clubbing or cyanosis.  There is no edema noted.  PERIPHERAL VASCULAR:  Pulses present and equally palpable; 2+ throughout.      Lab:     CBC:   Lab Results   Component Value Date    WBC 7.4 10/31/2023    RBC 5.16 10/31/2023    HGB 15.1 10/31/2023    HCT 46.2 10/31/2023     10/31/2023        CMP:    Lab Results   Component Value Date     10/31/2023    K 3.9 10/31/2023     10/31/2023    CO2 26 10/31/2023    BUN 18 10/31/2023    CREATININE 1.13 10/31/2023    GLUCOSE 119 (H) 10/31/2023    CALCIUM 9.5 10/31/2023       Magnesium:    Lab Results   Component Value Date    MG 2.34 10/31/2023       Lipid Profile:    Lab Results   Component Value Date    TRIG 430 (H) 04/12/2022    HDL 41.0 04/12/2022       BMP:  Lab Results   Component Value Date     10/31/2023     04/12/2022    K 3.9 10/31/2023    K 4.2 04/12/2022     10/31/2023     04/12/2022    CO2 26 10/31/2023    CO2 30  04/12/2022    BUN 18 10/31/2023    BUN 15 04/12/2022    CREATININE 1.13 10/31/2023    CREATININE 1.16 04/12/2022       CBC:  Lab Results   Component Value Date    WBC 7.4 10/31/2023    WBC 7.1 04/12/2022    RBC 5.16 10/31/2023    RBC 5.44 04/12/2022    HGB 15.1 10/31/2023    HGB 15.8 04/12/2022    HCT 46.2 10/31/2023    HCT 49.3 04/12/2022    MCV 90 10/31/2023    MCV 91 04/12/2022    MCH 29.3 10/31/2023    MCHC 32.7 10/31/2023    MCHC 32.0 04/12/2022    RDW 13.4 10/31/2023    RDW 13.2 04/12/2022     10/31/2023     04/12/2022    MPV 9.2 10/31/2023       Cardiac Enzymes:    Lab Results   Component Value Date    TROPHS 4 10/31/2023    TROPHS 5 10/31/2023       Hepatic Function Panel:    Lab Results   Component Value Date    ALKPHOS 39 10/31/2023    ALT 43 10/31/2023    AST 26 10/31/2023    PROT 7.3 10/31/2023    BILITOT 0.4 10/31/2023       Diagnostic Studies:     ECG 12 lead    Result Date: 11/1/2023  Normal sinus rhythm Normal ECG No previous ECGs available Confirmed by Kaushik Alaniz ([id number]) on 11/1/2023 2:37:26 PM      Problem List:     Patient Active Problem List   Diagnosis    Achilles tendon pain    Anxiety and depression    History of melanoma    Hyperlipidemia    Lump of skin of back    Pain of right upper extremity    Sebaceous cyst    Tendinitis of ankle    Tendinitis of elbow    Tendonitis       Assessment:     Problem List Items Addressed This Visit             ICD-10-CM    Anxiety and depression F41.9, F32.A    Mixed hyperlipidemia - Primary E78.2    Chest pain R07.9    Family history of premature coronary artery disease Z82.49     Other Visit Diagnoses         Codes    SOB (shortness of breath) on exertion     R06.02            Plan:     Schedule coronary CT angiogram.    Increase rosuvastatin to 40 mg daily.  Start aspirin 81 mg daily.    -He was advised to restrict sodium with an aim of no more than 2 grams per day.     -He was advised on the need to follow a low carbohydrate/low saturated  fat/weight reducing diet.     -He was advised to seek medical attention for any new or worsening symptoms.    -He will follow-up after testing is completed.

## 2023-11-29 ENCOUNTER — OFFICE VISIT (OUTPATIENT)
Dept: PRIMARY CARE | Facility: CLINIC | Age: 53
End: 2023-11-29
Payer: COMMERCIAL

## 2023-11-29 ENCOUNTER — APPOINTMENT (OUTPATIENT)
Dept: SLEEP MEDICINE | Facility: HOSPITAL | Age: 53
End: 2023-11-29
Payer: COMMERCIAL

## 2023-11-29 VITALS
HEART RATE: 88 BPM | BODY MASS INDEX: 30.21 KG/M2 | DIASTOLIC BLOOD PRESSURE: 86 MMHG | SYSTOLIC BLOOD PRESSURE: 122 MMHG | TEMPERATURE: 97 F | RESPIRATION RATE: 16 BRPM | HEIGHT: 70 IN | WEIGHT: 211 LBS | OXYGEN SATURATION: 98 %

## 2023-11-29 DIAGNOSIS — R07.9 CHEST PAIN, UNSPECIFIED TYPE: Primary | ICD-10-CM

## 2023-11-29 DIAGNOSIS — M79.674 PAIN OF TOE OF RIGHT FOOT: ICD-10-CM

## 2023-11-29 DIAGNOSIS — M77.9 TENDONITIS: ICD-10-CM

## 2023-11-29 DIAGNOSIS — E78.2 MIXED HYPERLIPIDEMIA: ICD-10-CM

## 2023-11-29 DIAGNOSIS — F41.9 ANXIETY AND DEPRESSION: ICD-10-CM

## 2023-11-29 DIAGNOSIS — F32.A ANXIETY AND DEPRESSION: ICD-10-CM

## 2023-11-29 DIAGNOSIS — L84 CALLUS OF TOE: ICD-10-CM

## 2023-11-29 PROCEDURE — 99213 OFFICE O/P EST LOW 20 MIN: CPT | Performed by: FAMILY MEDICINE

## 2023-11-29 PROCEDURE — 1036F TOBACCO NON-USER: CPT | Performed by: FAMILY MEDICINE

## 2023-11-29 RX ORDER — DICLOFENAC SODIUM 75 MG/1
75 TABLET, DELAYED RELEASE ORAL NIGHTLY PRN
Qty: 90 TABLET | Refills: 2 | Status: SHIPPED | OUTPATIENT
Start: 2023-11-29 | End: 2024-01-15 | Stop reason: WASHOUT

## 2023-11-29 ASSESSMENT — PATIENT HEALTH QUESTIONNAIRE - PHQ9
10. IF YOU CHECKED OFF ANY PROBLEMS, HOW DIFFICULT HAVE THESE PROBLEMS MADE IT FOR YOU TO DO YOUR WORK, TAKE CARE OF THINGS AT HOME, OR GET ALONG WITH OTHER PEOPLE: NOT DIFFICULT AT ALL
1. LITTLE INTEREST OR PLEASURE IN DOING THINGS: SEVERAL DAYS
SUM OF ALL RESPONSES TO PHQ9 QUESTIONS 1 AND 2: 2
2. FEELING DOWN, DEPRESSED OR HOPELESS: SEVERAL DAYS

## 2023-11-29 NOTE — PROGRESS NOTES
"Subjective   Patient ID: Alphonso King is a 53 y.o. male who presents for ER Follow-up and Toe Pain.  HPI    ER follow up. Patient went to [facility]. Patient was there on 10/31/23 for chest pain. Patient had EKG. BW, XR chest 2v. States he still has the chest pain occasionally, but it's not as bad.   Went to see Dr. Wynn on 11-22-23. He was started on Aspirin, and his statin was increased.   States he is scheduled for additional testing within the next couple weeks.     Has 4th right toe pain x 2 weeks. Denies injury, or swelling. Has tried nothing for it.       BW ordered 5/23/23 and 10/23/23 - NOT DONE       Taking current medications which were reviewed.  Problem list discussed.    Overall doing well.  Eating okay.  Staying active.    Has no other new problem /question.     ROS  Constitutional- No activity change. No appetite change.  Eyes- Denies vision changes.  Respiratory- No shortness of breath.  Cardiovascular- No palpitations. No chest pain.  GI- No nausea or vomiting. No diarrhea or constipation. Denies abdominal pain.  Musculoskeletal- Denies joint swelling.  Extremities- No edema.  Neurological- Denies headaches. Denies dizziness.  Skin- No rashes.  Psychiatric/Behavioral- Denies significant anxiety, or depressed mood.     Objective     /86   Pulse 88   Temp 36.1 °C (97 °F)   Resp 16   Ht 1.778 m (5' 10\")   Wt 95.7 kg (211 lb)   SpO2 98%   BMI 30.28 kg/m²     No Known Allergies    Constitutional-- Well-nourished.  No distress  Head- unremarkable.  Eyes- PERRL.  Conjunctiva normal.  Nose- Normal.  No rhinorrhea noted.  Throat- Oropharynx is clear and moist.  Neck- Supple with no thyromegaly.  No significant cervical adenopathy noted.  Pulmonary/Chest- Breath sounds normal with normal effort.  No wheezing.  Heart- Regular rate and rhythm.  No murmur.  Abdomen- Soft and non-tender.  No masses noted.  Musculoskeletal- Normal ROM.  No significant joint swelling  Extremities- No edema.   Neurological- " Alert.  No noted deficits.  Skin- Warm.  No rashes.  Fourth toe of that foot examined with significant callus on the bottom distal toe.  This was pared down with a #15 blade.  He tolerated well  Psychiatric/Behavioral- Mood and affect normal.  Behavior normal.     Assessment/Plan   1. Chest pain, unspecified type        2. Mixed hyperlipidemia        3. Anxiety and depression        4. Tendonitis  diclofenac (Voltaren) 75 mg EC tablet      5. Pain of toe of right foot        6. Callus of toe               Long talk. Treatment options reviewed.  Callus care discussed.  Talked about keeping it soft and filed down.  Anxiety and mood stable  Atypical chest pain seeing cardiology  Continue and take your medications as prescribed.    Health Maintenance issues discussed.    Importance of healthy diet and regular exercise regimen discussed.    We will contact you with any test results ordered. If you do not hear from us, please contact.    Follow-up as instructed or sooner if any problems or symptoms do not resolve as expected.

## 2023-12-04 ENCOUNTER — CLINICAL SUPPORT (OUTPATIENT)
Dept: SLEEP MEDICINE | Facility: HOSPITAL | Age: 53
End: 2023-12-04
Payer: COMMERCIAL

## 2023-12-04 DIAGNOSIS — G47.33 OBSTRUCTIVE SLEEP APNEA (ADULT) (PEDIATRIC): ICD-10-CM

## 2023-12-04 DIAGNOSIS — R53.83 FATIGUE, UNSPECIFIED TYPE: ICD-10-CM

## 2023-12-04 DIAGNOSIS — R06.83 SNORING: ICD-10-CM

## 2023-12-04 PROCEDURE — 95806 SLEEP STUDY UNATT&RESP EFFT: CPT | Performed by: INTERNAL MEDICINE

## 2023-12-04 NOTE — PROGRESS NOTES
Type of Study: HOME SLEEP STUDY - NOMAD     The patient received equipment and instructions for use of the [email or web address] [facility] Nomad HSAT device. The patient was instructed how to apply the effort belts, cannula, thermistor. It was also explained how the Nomad and oximeter components work.  The patient was asked to record their sleep for an 8-hour period.     The patient was informed of their responsibility for the device and acknowledged this by signing the HSAT device contract. The patient was asked to return the device on 12/5/2023 between the hours of 9am to the [facility].     The patient was instructed to call 911 as usual for any medical- emergencies while at home.  The patient was also given a phone number for troubleshooting when using the device in case there were additional questions.

## 2023-12-06 ENCOUNTER — TELEPHONE (OUTPATIENT)
Dept: PRIMARY CARE | Facility: CLINIC | Age: 53
End: 2023-12-06
Payer: COMMERCIAL

## 2023-12-06 NOTE — TELEPHONE ENCOUNTER
----- Message from Tae Aguilar MD sent at 12/6/2023  7:24 AM EST -----  Please let him know his sleep study is positive for severe sleep apnea.  Recommend arranging for auto CPAP 4 to 15 cm water pressure.  He should follow-up with me about 1 month after starting CPAP to document effectiveness.  Please let him know

## 2023-12-06 NOTE — TELEPHONE ENCOUNTER
SPOKE WITH PATIENT - HE IS AWARE THAT HE IS POSITIVE FOR SEVERE SLEEP APNEA - INFORMED HIM THAT WE WILL SEND EVERYTHING OVER TO [facility] FOR HIS CPAP MACHINE.

## 2023-12-07 ENCOUNTER — TELEPHONE (OUTPATIENT)
Dept: CARDIOLOGY | Facility: CLINIC | Age: 53
End: 2023-12-07
Payer: COMMERCIAL

## 2023-12-07 DIAGNOSIS — R07.9 CHEST PAIN, UNSPECIFIED TYPE: ICD-10-CM

## 2023-12-11 RX ORDER — ATENOLOL 100 MG/1
100 TABLET ORAL DAILY
Qty: 90 TABLET | Refills: 3 | Status: SHIPPED | OUTPATIENT
Start: 2023-12-11 | End: 2023-12-19 | Stop reason: WASHOUT

## 2023-12-12 ENCOUNTER — ANCILLARY PROCEDURE (OUTPATIENT)
Dept: RADIOLOGY | Facility: CLINIC | Age: 53
End: 2023-12-12
Payer: COMMERCIAL

## 2023-12-12 VITALS
DIASTOLIC BLOOD PRESSURE: 92 MMHG | OXYGEN SATURATION: 98 % | RESPIRATION RATE: 16 BRPM | SYSTOLIC BLOOD PRESSURE: 146 MMHG | HEART RATE: 66 BPM

## 2023-12-12 DIAGNOSIS — R07.9 CHEST PAIN, UNSPECIFIED TYPE: ICD-10-CM

## 2023-12-12 DIAGNOSIS — R07.89 CHEST DISCOMFORT: ICD-10-CM

## 2023-12-12 DIAGNOSIS — R93.1 ABNORMAL FINDINGS ON DIAGNOSTIC IMAGING OF HEART AND CORONARY CIRCULATION: ICD-10-CM

## 2023-12-12 DIAGNOSIS — R06.02 SOB (SHORTNESS OF BREATH) ON EXERTION: ICD-10-CM

## 2023-12-12 DIAGNOSIS — R93.1 ABNORMAL CARDIAC CT ANGIOGRAPHY: ICD-10-CM

## 2023-12-12 PROCEDURE — 76377 3D RENDER W/INTRP POSTPROCES: CPT | Performed by: INTERNAL MEDICINE

## 2023-12-12 PROCEDURE — 2550000001 HC RX 255 CONTRASTS: Performed by: INTERNAL MEDICINE

## 2023-12-12 PROCEDURE — 75574 CT ANGIO HRT W/3D IMAGE: CPT

## 2023-12-12 PROCEDURE — 0503T CT HEARTFLOW ANALYSIS: CPT

## 2023-12-12 PROCEDURE — 75580 N-INVAS EST C FFR SW ALY CTA: CPT

## 2023-12-12 PROCEDURE — 2500000001 HC RX 250 WO HCPCS SELF ADMINISTERED DRUGS (ALT 637 FOR MEDICARE OP): Performed by: INTERNAL MEDICINE

## 2023-12-12 RX ORDER — NITROGLYCERIN 400 UG/1
2 SPRAY ORAL ONCE
Status: COMPLETED | OUTPATIENT
Start: 2023-12-12 | End: 2023-12-12

## 2023-12-12 RX ORDER — ATENOLOL 100 MG/1
100 TABLET ORAL ONCE
Status: COMPLETED | OUTPATIENT
Start: 2023-12-12 | End: 2023-12-12

## 2023-12-12 RX ADMIN — ATENOLOL 100 MG: 25 TABLET ORAL at 09:05

## 2023-12-12 RX ADMIN — NITROGLYCERIN 2 SPRAY: 400 SPRAY ORAL at 09:38

## 2023-12-12 RX ADMIN — IOHEXOL 80 ML: 350 INJECTION, SOLUTION INTRAVENOUS at 10:26

## 2023-12-12 RX ADMIN — ATENOLOL 100 MG: 25 TABLET ORAL at 08:32

## 2023-12-12 NOTE — NURSING NOTE
Post CCTA pt denies feeling dizzy or lightheaded, c/o slight headache improving with caffeine. Steady on feet, skin warm pink and dry. Pt verbalized understanding of increased water intake x24 hours, educated on side effects of medications. HL removed with tip intact, manual pressure held until hemostasis achieved, 2x2 and coban to site. Pt DC home to self care.

## 2023-12-15 NOTE — PROGRESS NOTES
Orders placed in chart and pended to Dr. Wynn.  Dr. Wynn will still need to fill in a few questions in order.  (Provider and if on dialysis)

## 2023-12-18 PROBLEM — R07.89 CHEST DISCOMFORT: Status: ACTIVE | Noted: 2023-12-12

## 2023-12-18 PROBLEM — R93.1 ABNORMAL CARDIAC CT ANGIOGRAPHY: Status: ACTIVE | Noted: 2023-12-12

## 2023-12-19 ENCOUNTER — OFFICE VISIT (OUTPATIENT)
Dept: CARDIOLOGY | Facility: CLINIC | Age: 53
End: 2023-12-19
Payer: COMMERCIAL

## 2023-12-19 VITALS
BODY MASS INDEX: 30.65 KG/M2 | DIASTOLIC BLOOD PRESSURE: 102 MMHG | HEIGHT: 70 IN | HEART RATE: 68 BPM | WEIGHT: 214.1 LBS | SYSTOLIC BLOOD PRESSURE: 142 MMHG

## 2023-12-19 DIAGNOSIS — I25.119 ATHEROSCLEROSIS OF NATIVE CORONARY ARTERY OF NATIVE HEART WITH ANGINA PECTORIS (CMS-HCC): Primary | ICD-10-CM

## 2023-12-19 DIAGNOSIS — R06.02 SOB (SHORTNESS OF BREATH) ON EXERTION: ICD-10-CM

## 2023-12-19 DIAGNOSIS — R07.9 CHEST PAIN, UNSPECIFIED TYPE: ICD-10-CM

## 2023-12-19 DIAGNOSIS — E78.2 MIXED HYPERLIPIDEMIA: ICD-10-CM

## 2023-12-19 DIAGNOSIS — Z82.49 FAMILY HISTORY OF PREMATURE CORONARY ARTERY DISEASE: ICD-10-CM

## 2023-12-19 DIAGNOSIS — F41.9 ANXIETY AND DEPRESSION: ICD-10-CM

## 2023-12-19 DIAGNOSIS — F32.A ANXIETY AND DEPRESSION: ICD-10-CM

## 2023-12-19 PROBLEM — Z87.891 FORMER SMOKER: Status: ACTIVE | Noted: 2023-12-19

## 2023-12-19 PROCEDURE — 1036F TOBACCO NON-USER: CPT | Performed by: INTERNAL MEDICINE

## 2023-12-19 PROCEDURE — 3008F BODY MASS INDEX DOCD: CPT | Performed by: INTERNAL MEDICINE

## 2023-12-19 PROCEDURE — 99214 OFFICE O/P EST MOD 30 MIN: CPT | Performed by: INTERNAL MEDICINE

## 2023-12-19 NOTE — PATIENT INSTRUCTIONS
Continue same medications/treatment.  Patient educated on proper medication use.  Patient educated on risk factor modification.  Please bring any lab results from other providers/physicians to your next appointment.    Please bring all medicines, vitamins, and herbal supplements with you when you come to the office.    Prescriptions will not be filled unless you are compliant with your follow up appointments or have a follow up appointment scheduled as per instruction of your physician. Refills should be requested at the time of your visit.    Follow up in after cath  Labs to be drawn prior to cath    Dr. Todd Wynn

## 2023-12-19 NOTE — H&P (VIEW-ONLY)
Patient:  Alphonso King  YOB: 1970  MRN: [id number]       HPI:       Alphonso King is a 53 y.o. male who returns today to discuss results of recent testing.  He is accompanied by his wife.  He does not have any history of atherosclerotic heart or valvular heart disease.  He has a history of significant mixed hyperlipidemia.  No history of hypertension or diabetes mellitus.  He stopped smoking tobacco several years ago.  He relates a strongly positive family history for premature coronary artery disease.  His father had a myocardial infarction in his 40s.  His paternal grandparents also had myocardial infarctions at relatively young ages.       He had an episode of chest pain on October 31, 2023.  He was awakened with a strong pressure-like discomfort.  After several minutes he was able to fall back asleep.  A few hours later he woke up and still noted nonradiating pressure discomfort in his left upper chest.  No associated nausea, vomiting, diaphoresis, or shortness of breath.  He states he had been under quite a bit of stress at home.  His symptoms resolved spontaneously.  He was evaluated in the emergency department.  Cardiac enzymes were negative.  D-dimer was negative.  CBC and CMP were normal.  Cholesterol was 263 with triglycerides 430, and HDL 41.  LDL unable to be calculated.  EKG did not show any significant ST changes.  Chest x-ray did not show any active disease.  He was discharged home with plans to follow-up with cardiology.       He was seen on November 22, 2023 and was scheduled for a coronary CT angiogram.  This study was completed on December 12, 2023 and showed an extensive amount of noncalcified plaque.  There was greater than 70% stenosis of the proximal LAD.  FFR was 0.64.  There was extensive outward remodeling of the plaque at the site of the stenosis.  There was 50% stenosis of the mid right PDA.  There was otherwise mild diffuse coronary artery disease.  Interestingly his coronary  artery calcium score was 0.  I spoke with him by phone regarding results and he is scheduled for cardiac cath and possible PCI on December 26, 2023.  States he has been feeling well.  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  He denies any recurrent episodes of chest discomfort. He has mild exertional shortness of breath.  He denies any orthopnea, PND, or increasing peripheral edema.  He denies any palpitations, lightheadedness, near-syncope, or syncope.  He denies any fever, chills, or cough.  He denies any nausea, vomiting, or diaphoresis.  He denies any hemoptysis, hematemesis, melena, or hematochezia.     I spoke at length with Alphonso and his wife at length regarding results of the CT angiogram.  At this point I advised him to start on Toprol-XL 25 mg daily.  He will continue on aspirin and atorvastatin.  He was also advised to start Imdur 30 mg daily.  He was strongly advised on the absolute need to avoid use of Viagra or Cialis while taking Imdur.  He was advised on use of PRN sublingual nitroglycerin and the need to seek medical attention immediately should he develop any new or recurrent symptoms.  He is scheduled to undergo cardiac catheterization along with angioplasty and stenting if deemed necessary on December 27, 2023.      Objective:     Vitals:    12/19/23 0901   BP: (!) 142/102   Pulse: 68       Wt Readings from Last 4 Encounters:   12/19/23 97.1 kg (214 lb 1.6 oz)   11/29/23 95.7 kg (211 lb)   11/22/23 95.3 kg (210 lb)   10/31/23 96.2 kg (212 lb)       Allergies:     No Known Allergies       Medications:     Current Outpatient Medications   Medication Instructions    aspirin 81 mg, oral, Daily    busPIRone (Buspar) 10 mg tablet TAKE 1-2 TABLETS BY MOUTH DAILY    diclofenac (VOLTAREN) 75 mg, oral, Nightly PRN, Do not crush, chew, or split.    rosuvastatin (CRESTOR) 40 mg, oral, Daily    tadalafil 20 mg tablet TAKE  1 TABLET BY MOUTH 1 HOUR BEFORE ACTIVITY AS NEEDED    venlafaxine XR (Effexor-XR) 150 mg 24 hr capsule TAKE 1 CAPSULE BY MOUTH EVERY DAY       Physical Examination:   GENERAL:  Well developed, well nourished, in no acute distress.  CHEST:  Symmetric and nontender.  NEURO/PSYCH:  Alert and oriented times three with approppriate behavior and responses.  NECK:  Supple, no JVD, no bruit.  LUNGS:  Clear to auscultation bilaterally, normal respiratory effort.  HEART:  Rate and rhythm regular with no evident murmur, no gallop appreciated.        There are no rubs, clicks or heaves.  EXTREMITIES:  Warm with good color, no clubbing or cyanosis.  There is no edema noted.  PERIPHERAL VASCULAR:  Pulses present and equally palpable; 2+ throughout.      Lab:     CBC:   Lab Results   Component Value Date    WBC 7.4 10/31/2023    RBC 5.16 10/31/2023    HGB 15.1 10/31/2023    HCT 46.2 10/31/2023     10/31/2023        CMP:    Lab Results   Component Value Date     10/31/2023    K 3.9 10/31/2023     10/31/2023    CO2 26 10/31/2023    BUN 18 10/31/2023    CREATININE 1.13 10/31/2023    GLUCOSE 119 (H) 10/31/2023    CALCIUM 9.5 10/31/2023       Magnesium:    Lab Results   Component Value Date    MG 2.34 10/31/2023       Lipid Profile:    Lab Results   Component Value Date    TRIG 430 (H) 04/12/2022    HDL 41.0 04/12/2022       BMP:  Lab Results   Component Value Date     10/31/2023     04/12/2022    K 3.9 10/31/2023    K 4.2 04/12/2022     10/31/2023     04/12/2022    CO2 26 10/31/2023    CO2 30 04/12/2022    BUN 18 10/31/2023    BUN 15 04/12/2022    CREATININE 1.13 10/31/2023    CREATININE 1.16 04/12/2022       CBC:  Lab Results   Component Value Date    WBC 7.4 10/31/2023    WBC 7.1 04/12/2022    RBC 5.16 10/31/2023    RBC 5.44 04/12/2022    HGB 15.1 10/31/2023    HGB 15.8 04/12/2022    HCT 46.2 10/31/2023    HCT 49.3 04/12/2022    MCV 90 10/31/2023    MCV 91 04/12/2022    MCH 29.3 10/31/2023     MCHC 32.7 10/31/2023    MCHC 32.0 04/12/2022    RDW 13.4 10/31/2023    RDW 13.2 04/12/2022     10/31/2023     04/12/2022    MPV 9.2 10/31/2023       Cardiac Enzymes:    Lab Results   Component Value Date    TROPHS 4 10/31/2023    TROPHS 5 10/31/2023       Hepatic Function Panel:    Lab Results   Component Value Date    ALKPHOS 39 10/31/2023    ALT 43 10/31/2023    AST 26 10/31/2023    PROT 7.3 10/31/2023    BILITOT 0.4 10/31/2023       Diagnostic Studies:     ECG 12 lead  Result Date: 11/1/2023    Normal sinus rhythm Normal ECG No previous ECGs available Confirmed by Kaushik Alaniz ([id number]) on 11/1/2023 2:37:26 PM      Problem List:     Patient Active Problem List   Diagnosis    Achilles tendon pain    Anxiety and depression    History of melanoma    Mixed hyperlipidemia    Lump of skin of back    Pain of right upper extremity    Sebaceous cyst    Tendinitis of ankle    Tendinitis of elbow    Tendonitis    Chest pain    Shortness of breath    Family history of premature coronary artery disease    Chest discomfort    Abnormal cardiac CT angiography       Asessment:     Problem List Items Addressed This Visit             ICD-10-CM    Anxiety and depression F41.9, F32.A    Mixed hyperlipidemia E78.2    Chest pain R07.9    Family history of premature coronary artery disease Z82.49    BMI 30.0-30.9,adult Z68.30     Other Visit Diagnoses         Codes    Atherosclerosis of native coronary artery of native heart with angina pectoris (CMS/[facility])    -  Primary I25.119    SOB (shortness of breath) on exertion     R06.02

## 2023-12-19 NOTE — PROGRESS NOTES
Patient:  Alphonso King  YOB: 1970  MRN: [id number]       HPI:       Alphonso King is a 53 y.o. male who returns today to discuss results of recent testing.  He is accompanied by his wife.  He does not have any history of atherosclerotic heart or valvular heart disease.  He has a history of significant mixed hyperlipidemia.  No history of hypertension or diabetes mellitus.  He stopped smoking tobacco several years ago.  He relates a strongly positive family history for premature coronary artery disease.  His father had a myocardial infarction in his 40s.  His paternal grandparents also had myocardial infarctions at relatively young ages.       He had an episode of chest pain on October 31, 2023.  He was awakened with a strong pressure-like discomfort.  After several minutes he was able to fall back asleep.  A few hours later he woke up and still noted nonradiating pressure discomfort in his left upper chest.  No associated nausea, vomiting, diaphoresis, or shortness of breath.  He states he had been under quite a bit of stress at home.  His symptoms resolved spontaneously.  He was evaluated in the emergency department.  Cardiac enzymes were negative.  D-dimer was negative.  CBC and CMP were normal.  Cholesterol was 263 with triglycerides 430, and HDL 41.  LDL unable to be calculated.  EKG did not show any significant ST changes.  Chest x-ray did not show any active disease.  He was discharged home with plans to follow-up with cardiology.       He was seen on November 22, 2023 and was scheduled for a coronary CT angiogram.  This study was completed on December 12, 2023 and showed an extensive amount of noncalcified plaque.  There was greater than 70% stenosis of the proximal LAD.  FFR was 0.64.  There was extensive outward remodeling of the plaque at the site of the stenosis.  There was 50% stenosis of the mid right PDA.  There was otherwise mild diffuse coronary artery disease.  Interestingly his coronary  artery calcium score was 0.  I spoke with him by phone regarding results and he is scheduled for cardiac cath and possible PCI on December 26, 2023.  States he has been feeling well.  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  He denies any recurrent episodes of chest discomfort. He has mild exertional shortness of breath.  He denies any orthopnea, PND, or increasing peripheral edema.  He denies any palpitations, lightheadedness, near-syncope, or syncope.  He denies any fever, chills, or cough.  He denies any nausea, vomiting, or diaphoresis.  He denies any hemoptysis, hematemesis, melena, or hematochezia.     I spoke at length with Alphonso and his wife at length regarding results of the CT angiogram.  At this point I advised him to start on Toprol-XL 25 mg daily.  He will continue on aspirin and atorvastatin.  He was also advised to start Imdur 30 mg daily.  He was strongly advised on the absolute need to avoid use of Viagra or Cialis while taking Imdur.  He was advised on use of PRN sublingual nitroglycerin and the need to seek medical attention immediately should he develop any new or recurrent symptoms.  He is scheduled to undergo cardiac catheterization along with angioplasty and stenting if deemed necessary on December 27, 2023.      Objective:     Vitals:    12/19/23 0901   BP: (!) 142/102   Pulse: 68       Wt Readings from Last 4 Encounters:   12/19/23 97.1 kg (214 lb 1.6 oz)   11/29/23 95.7 kg (211 lb)   11/22/23 95.3 kg (210 lb)   10/31/23 96.2 kg (212 lb)       Allergies:     No Known Allergies       Medications:     Current Outpatient Medications   Medication Instructions    aspirin 81 mg, oral, Daily    busPIRone (Buspar) 10 mg tablet TAKE 1-2 TABLETS BY MOUTH DAILY    diclofenac (VOLTAREN) 75 mg, oral, Nightly PRN, Do not crush, chew, or split.    rosuvastatin (CRESTOR) 40 mg, oral, Daily    tadalafil 20 mg tablet TAKE  1 TABLET BY MOUTH 1 HOUR BEFORE ACTIVITY AS NEEDED    venlafaxine XR (Effexor-XR) 150 mg 24 hr capsule TAKE 1 CAPSULE BY MOUTH EVERY DAY       Physical Examination:   GENERAL:  Well developed, well nourished, in no acute distress.  CHEST:  Symmetric and nontender.  NEURO/PSYCH:  Alert and oriented times three with approppriate behavior and responses.  NECK:  Supple, no JVD, no bruit.  LUNGS:  Clear to auscultation bilaterally, normal respiratory effort.  HEART:  Rate and rhythm regular with no evident murmur, no gallop appreciated.        There are no rubs, clicks or heaves.  EXTREMITIES:  Warm with good color, no clubbing or cyanosis.  There is no edema noted.  PERIPHERAL VASCULAR:  Pulses present and equally palpable; 2+ throughout.      Lab:     CBC:   Lab Results   Component Value Date    WBC 7.4 10/31/2023    RBC 5.16 10/31/2023    HGB 15.1 10/31/2023    HCT 46.2 10/31/2023     10/31/2023        CMP:    Lab Results   Component Value Date     10/31/2023    K 3.9 10/31/2023     10/31/2023    CO2 26 10/31/2023    BUN 18 10/31/2023    CREATININE 1.13 10/31/2023    GLUCOSE 119 (H) 10/31/2023    CALCIUM 9.5 10/31/2023       Magnesium:    Lab Results   Component Value Date    MG 2.34 10/31/2023       Lipid Profile:    Lab Results   Component Value Date    TRIG 430 (H) 04/12/2022    HDL 41.0 04/12/2022       BMP:  Lab Results   Component Value Date     10/31/2023     04/12/2022    K 3.9 10/31/2023    K 4.2 04/12/2022     10/31/2023     04/12/2022    CO2 26 10/31/2023    CO2 30 04/12/2022    BUN 18 10/31/2023    BUN 15 04/12/2022    CREATININE 1.13 10/31/2023    CREATININE 1.16 04/12/2022       CBC:  Lab Results   Component Value Date    WBC 7.4 10/31/2023    WBC 7.1 04/12/2022    RBC 5.16 10/31/2023    RBC 5.44 04/12/2022    HGB 15.1 10/31/2023    HGB 15.8 04/12/2022    HCT 46.2 10/31/2023    HCT 49.3 04/12/2022    MCV 90 10/31/2023    MCV 91 04/12/2022    MCH 29.3 10/31/2023     MCHC 32.7 10/31/2023    MCHC 32.0 04/12/2022    RDW 13.4 10/31/2023    RDW 13.2 04/12/2022     10/31/2023     04/12/2022    MPV 9.2 10/31/2023       Cardiac Enzymes:    Lab Results   Component Value Date    TROPHS 4 10/31/2023    TROPHS 5 10/31/2023       Hepatic Function Panel:    Lab Results   Component Value Date    ALKPHOS 39 10/31/2023    ALT 43 10/31/2023    AST 26 10/31/2023    PROT 7.3 10/31/2023    BILITOT 0.4 10/31/2023       Diagnostic Studies:     ECG 12 lead  Result Date: 11/1/2023    Normal sinus rhythm Normal ECG No previous ECGs available Confirmed by Kaushik Alaniz ([id number]) on 11/1/2023 2:37:26 PM      Problem List:     Patient Active Problem List   Diagnosis    Achilles tendon pain    Anxiety and depression    History of melanoma    Mixed hyperlipidemia    Lump of skin of back    Pain of right upper extremity    Sebaceous cyst    Tendinitis of ankle    Tendinitis of elbow    Tendonitis    Chest pain    Shortness of breath    Family history of premature coronary artery disease    Chest discomfort    Abnormal cardiac CT angiography       Asessment:     Problem List Items Addressed This Visit             ICD-10-CM    Anxiety and depression F41.9, F32.A    Mixed hyperlipidemia E78.2    Chest pain R07.9    Family history of premature coronary artery disease Z82.49    BMI 30.0-30.9,adult Z68.30     Other Visit Diagnoses         Codes    Atherosclerosis of native coronary artery of native heart with angina pectoris (CMS/[facility])    -  Primary I25.119    SOB (shortness of breath) on exertion     R06.02

## 2023-12-20 DIAGNOSIS — R06.02 SHORTNESS OF BREATH: ICD-10-CM

## 2023-12-20 DIAGNOSIS — R07.89 CHEST DISCOMFORT: ICD-10-CM

## 2023-12-20 DIAGNOSIS — R93.1 ABNORMAL CARDIAC CT ANGIOGRAPHY: ICD-10-CM

## 2023-12-20 DIAGNOSIS — R07.9 CHEST PAIN, UNSPECIFIED TYPE: ICD-10-CM

## 2023-12-20 RX ORDER — NITROGLYCERIN 0.4 MG/1
0.4 TABLET SUBLINGUAL EVERY 5 MIN PRN
COMMUNITY
End: 2023-12-20 | Stop reason: SDUPTHER

## 2023-12-20 RX ORDER — METOPROLOL SUCCINATE 25 MG/1
25 TABLET, EXTENDED RELEASE ORAL DAILY
COMMUNITY
End: 2023-12-20 | Stop reason: SDUPTHER

## 2023-12-20 NOTE — TELEPHONE ENCOUNTER
Pt called stating that he had OV yesterday and new script for Nirto and Toprol XL 25 every day were not sent to [facility] in [place] on [address] as requested. To Dr. Wynn to verify

## 2023-12-21 RX ORDER — NITROGLYCERIN 0.4 MG/1
0.4 TABLET SUBLINGUAL EVERY 5 MIN PRN
Qty: 25 TABLET | Refills: 11 | Status: SHIPPED | OUTPATIENT
Start: 2023-12-21

## 2023-12-21 RX ORDER — METOPROLOL SUCCINATE 25 MG/1
25 TABLET, EXTENDED RELEASE ORAL DAILY
Qty: 90 TABLET | Refills: 2 | Status: SHIPPED | OUTPATIENT
Start: 2023-12-21

## 2023-12-26 ENCOUNTER — APPOINTMENT (OUTPATIENT)
Dept: CARDIOLOGY | Facility: HOSPITAL | Age: 53
End: 2023-12-26
Payer: COMMERCIAL

## 2023-12-26 ENCOUNTER — HOSPITAL ENCOUNTER (OUTPATIENT)
Facility: HOSPITAL | Age: 53
Setting detail: OUTPATIENT SURGERY
Discharge: HOME | End: 2023-12-26
Attending: INTERNAL MEDICINE | Admitting: INTERNAL MEDICINE
Payer: COMMERCIAL

## 2023-12-26 VITALS
TEMPERATURE: 96.3 F | RESPIRATION RATE: 18 BRPM | WEIGHT: 209 LBS | OXYGEN SATURATION: 99 % | HEART RATE: 70 BPM | BODY MASS INDEX: 29.92 KG/M2 | DIASTOLIC BLOOD PRESSURE: 102 MMHG | HEIGHT: 70 IN | SYSTOLIC BLOOD PRESSURE: 143 MMHG

## 2023-12-26 DIAGNOSIS — Z95.5 S/P DRUG ELUTING CORONARY STENT PLACEMENT: ICD-10-CM

## 2023-12-26 DIAGNOSIS — I25.119 ATHEROSCLEROTIC HEART DISEASE OF NATIVE CORONARY ARTERY WITH UNSPECIFIED ANGINA PECTORIS (CMS-HCC): ICD-10-CM

## 2023-12-26 DIAGNOSIS — R93.1 ABNORMAL CARDIAC CT ANGIOGRAPHY: ICD-10-CM

## 2023-12-26 DIAGNOSIS — R07.89 CHEST DISCOMFORT: Primary | ICD-10-CM

## 2023-12-26 LAB
ANION GAP SERPL CALC-SCNC: 12 MMOL/L (ref 10–20)
BUN SERPL-MCNC: 22 MG/DL (ref 6–23)
CALCIUM SERPL-MCNC: 8.7 MG/DL (ref 8.6–10.3)
CHLORIDE SERPL-SCNC: 105 MMOL/L (ref 98–107)
CO2 SERPL-SCNC: 25 MMOL/L (ref 21–32)
CREAT SERPL-MCNC: 1.21 MG/DL (ref 0.5–1.3)
ERYTHROCYTE [DISTWIDTH] IN BLOOD BY AUTOMATED COUNT: 13.4 % (ref 11.5–14.5)
GFR SERPL CREATININE-BSD FRML MDRD: 72 ML/MIN/1.73M*2
GLUCOSE SERPL-MCNC: 93 MG/DL (ref 74–99)
HCT VFR BLD AUTO: 40.9 % (ref 41–52)
HGB BLD-MCNC: 13.9 G/DL (ref 13.5–17.5)
MCH RBC QN AUTO: 30.2 PG (ref 26–34)
MCHC RBC AUTO-ENTMCNC: 34 G/DL (ref 32–36)
MCV RBC AUTO: 89 FL (ref 80–100)
NRBC BLD-RTO: 0 /100 WBCS (ref 0–0)
PLATELET # BLD AUTO: 171 X10*3/UL (ref 150–450)
POTASSIUM SERPL-SCNC: 3.6 MMOL/L (ref 3.5–5.3)
RBC # BLD AUTO: 4.61 X10*6/UL (ref 4.5–5.9)
SODIUM SERPL-SCNC: 138 MMOL/L (ref 136–145)
WBC # BLD AUTO: 7.2 X10*3/UL (ref 4.4–11.3)

## 2023-12-26 PROCEDURE — 2500000001 HC RX 250 WO HCPCS SELF ADMINISTERED DRUGS (ALT 637 FOR MEDICARE OP): Performed by: INTERNAL MEDICINE

## 2023-12-26 PROCEDURE — G0269 OCCLUSIVE DEVICE IN VEIN ART: HCPCS | Mod: TC,59 | Performed by: INTERNAL MEDICINE

## 2023-12-26 PROCEDURE — 92928 PRQ TCAT PLMT NTRAC ST 1 LES: CPT | Performed by: INTERNAL MEDICINE

## 2023-12-26 PROCEDURE — 2780000003 HC OR 278 NO HCPCS: Performed by: INTERNAL MEDICINE

## 2023-12-26 PROCEDURE — 93010 ELECTROCARDIOGRAM REPORT: CPT | Performed by: INTERNAL MEDICINE

## 2023-12-26 PROCEDURE — 2500000004 HC RX 250 GENERAL PHARMACY W/ HCPCS (ALT 636 FOR OP/ED): Performed by: INTERNAL MEDICINE

## 2023-12-26 PROCEDURE — 2500000004 HC RX 250 GENERAL PHARMACY W/ HCPCS (ALT 636 FOR OP/ED): Performed by: NURSE PRACTITIONER

## 2023-12-26 PROCEDURE — 99153 MOD SED SAME PHYS/QHP EA: CPT | Performed by: INTERNAL MEDICINE

## 2023-12-26 PROCEDURE — C1725 CATH, TRANSLUMIN NON-LASER: HCPCS | Performed by: INTERNAL MEDICINE

## 2023-12-26 PROCEDURE — C1760 CLOSURE DEV, VASC: HCPCS | Performed by: INTERNAL MEDICINE

## 2023-12-26 PROCEDURE — 2500000001 HC RX 250 WO HCPCS SELF ADMINISTERED DRUGS (ALT 637 FOR MEDICARE OP): Performed by: NURSE PRACTITIONER

## 2023-12-26 PROCEDURE — 80048 BASIC METABOLIC PNL TOTAL CA: CPT | Performed by: NURSE PRACTITIONER

## 2023-12-26 PROCEDURE — 93458 L HRT ARTERY/VENTRICLE ANGIO: CPT | Performed by: INTERNAL MEDICINE

## 2023-12-26 PROCEDURE — 7100000009 HC PHASE TWO TIME - INITIAL BASE CHARGE: Performed by: INTERNAL MEDICINE

## 2023-12-26 PROCEDURE — 7100000010 HC PHASE TWO TIME - EACH INCREMENTAL 1 MINUTE: Performed by: INTERNAL MEDICINE

## 2023-12-26 PROCEDURE — C1887 CATHETER, GUIDING: HCPCS | Performed by: INTERNAL MEDICINE

## 2023-12-26 PROCEDURE — 85347 COAGULATION TIME ACTIVATED: CPT

## 2023-12-26 PROCEDURE — 2550000001 HC RX 255 CONTRASTS: Performed by: INTERNAL MEDICINE

## 2023-12-26 PROCEDURE — 2500000005 HC RX 250 GENERAL PHARMACY W/O HCPCS: Performed by: INTERNAL MEDICINE

## 2023-12-26 PROCEDURE — 36415 COLL VENOUS BLD VENIPUNCTURE: CPT | Performed by: NURSE PRACTITIONER

## 2023-12-26 PROCEDURE — C1874 STENT, COATED/COV W/DEL SYS: HCPCS | Performed by: INTERNAL MEDICINE

## 2023-12-26 PROCEDURE — 99152 MOD SED SAME PHYS/QHP 5/>YRS: CPT | Performed by: INTERNAL MEDICINE

## 2023-12-26 PROCEDURE — C1769 GUIDE WIRE: HCPCS | Performed by: INTERNAL MEDICINE

## 2023-12-26 PROCEDURE — 93005 ELECTROCARDIOGRAM TRACING: CPT | Mod: 59

## 2023-12-26 PROCEDURE — 93458 L HRT ARTERY/VENTRICLE ANGIO: CPT | Mod: 59 | Performed by: INTERNAL MEDICINE

## 2023-12-26 PROCEDURE — C9600 PERC DRUG-EL COR STENT SING: HCPCS | Performed by: INTERNAL MEDICINE

## 2023-12-26 PROCEDURE — 85347 COAGULATION TIME ACTIVATED: CPT | Performed by: INTERNAL MEDICINE

## 2023-12-26 PROCEDURE — 85027 COMPLETE CBC AUTOMATED: CPT | Performed by: NURSE PRACTITIONER

## 2023-12-26 PROCEDURE — 2720000007 HC OR 272 NO HCPCS: Performed by: INTERNAL MEDICINE

## 2023-12-26 DEVICE — STENT ONYXNG35018UX ONYX 3.50X18RX
Type: IMPLANTABLE DEVICE | Site: HEART | Status: FUNCTIONAL
Brand: ONYX FRONTIER™

## 2023-12-26 DEVICE — STENT ONYXNG30012UX ONYX 3.00X12RX
Type: IMPLANTABLE DEVICE | Site: HEART | Status: FUNCTIONAL
Brand: ONYX FRONTIER™

## 2023-12-26 RX ORDER — FENTANYL CITRATE 50 UG/ML
INJECTION, SOLUTION INTRAMUSCULAR; INTRAVENOUS AS NEEDED
Status: DISCONTINUED | OUTPATIENT
Start: 2023-12-26 | End: 2023-12-26 | Stop reason: HOSPADM

## 2023-12-26 RX ORDER — NAPROXEN SODIUM 220 MG/1
81 TABLET, FILM COATED ORAL DAILY
Status: DISCONTINUED | OUTPATIENT
Start: 2023-12-27 | End: 2023-12-26 | Stop reason: HOSPADM

## 2023-12-26 RX ORDER — MORPHINE SULFATE 2 MG/ML
2 INJECTION, SOLUTION INTRAMUSCULAR; INTRAVENOUS
Status: DISCONTINUED | OUTPATIENT
Start: 2023-12-26 | End: 2023-12-26 | Stop reason: HOSPADM

## 2023-12-26 RX ORDER — CLOPIDOGREL BISULFATE 75 MG/1
75 TABLET ORAL DAILY
Qty: 30 TABLET | Refills: 11 | Status: CANCELLED | OUTPATIENT
Start: 2023-12-27 | End: 2024-12-26

## 2023-12-26 RX ORDER — NITROGLYCERIN 40 MG/100ML
INJECTION INTRAVENOUS AS NEEDED
Status: DISCONTINUED | OUTPATIENT
Start: 2023-12-26 | End: 2023-12-26 | Stop reason: HOSPADM

## 2023-12-26 RX ORDER — CLOPIDOGREL BISULFATE 300 MG/1
TABLET, FILM COATED ORAL AS NEEDED
Status: DISCONTINUED | OUTPATIENT
Start: 2023-12-26 | End: 2023-12-26 | Stop reason: HOSPADM

## 2023-12-26 RX ORDER — SODIUM CHLORIDE 9 MG/ML
100 INJECTION, SOLUTION INTRAVENOUS CONTINUOUS
Status: DISCONTINUED | OUTPATIENT
Start: 2023-12-26 | End: 2023-12-26 | Stop reason: HOSPADM

## 2023-12-26 RX ORDER — CLOPIDOGREL BISULFATE 75 MG/1
75 TABLET ORAL DAILY
Qty: 30 TABLET | Refills: 11 | Status: SHIPPED | OUTPATIENT
Start: 2023-12-27 | End: 2024-01-15 | Stop reason: SDUPTHER

## 2023-12-26 RX ORDER — NITROGLYCERIN 0.4 MG/1
0.4 TABLET SUBLINGUAL EVERY 5 MIN PRN
Status: DISCONTINUED | OUTPATIENT
Start: 2023-12-26 | End: 2023-12-26 | Stop reason: HOSPADM

## 2023-12-26 RX ORDER — MIDAZOLAM HYDROCHLORIDE 1 MG/ML
INJECTION, SOLUTION INTRAMUSCULAR; INTRAVENOUS AS NEEDED
Status: DISCONTINUED | OUTPATIENT
Start: 2023-12-26 | End: 2023-12-26 | Stop reason: HOSPADM

## 2023-12-26 RX ORDER — METOPROLOL TARTRATE 25 MG/1
12.5 TABLET, FILM COATED ORAL ONCE
Status: COMPLETED | OUTPATIENT
Start: 2023-12-26 | End: 2023-12-26

## 2023-12-26 RX ORDER — LIDOCAINE HYDROCHLORIDE 20 MG/ML
INJECTION, SOLUTION INFILTRATION; PERINEURAL AS NEEDED
Status: DISCONTINUED | OUTPATIENT
Start: 2023-12-26 | End: 2023-12-26 | Stop reason: HOSPADM

## 2023-12-26 RX ORDER — ACETAMINOPHEN 325 MG/1
650 TABLET ORAL EVERY 6 HOURS PRN
Status: DISCONTINUED | OUTPATIENT
Start: 2023-12-26 | End: 2023-12-26 | Stop reason: HOSPADM

## 2023-12-26 RX ORDER — RANOLAZINE 500 MG/1
500 TABLET, EXTENDED RELEASE ORAL ONCE
Status: COMPLETED | OUTPATIENT
Start: 2023-12-26 | End: 2023-12-26

## 2023-12-26 RX ORDER — CLOPIDOGREL BISULFATE 75 MG/1
75 TABLET ORAL DAILY
Status: DISCONTINUED | OUTPATIENT
Start: 2023-12-27 | End: 2023-12-26 | Stop reason: HOSPADM

## 2023-12-26 RX ORDER — ASPIRIN 325 MG
325 TABLET ORAL ONCE
Status: COMPLETED | OUTPATIENT
Start: 2023-12-26 | End: 2023-12-26

## 2023-12-26 RX ORDER — SODIUM CHLORIDE 9 MG/ML
100 INJECTION, SOLUTION INTRAVENOUS CONTINUOUS
Status: DISCONTINUED | OUTPATIENT
Start: 2023-12-26 | End: 2023-12-26

## 2023-12-26 RX ADMIN — ASPIRIN 325 MG: 325 TABLET ORAL at 08:33

## 2023-12-26 RX ADMIN — RANOLAZINE 500 MG: 500 TABLET, FILM COATED, EXTENDED RELEASE ORAL at 08:33

## 2023-12-26 RX ADMIN — SODIUM CHLORIDE 100 ML/HR: 9 INJECTION, SOLUTION INTRAVENOUS at 08:35

## 2023-12-26 RX ADMIN — METOPROLOL TARTRATE 12.5 MG: 25 TABLET, FILM COATED ORAL at 08:34

## 2023-12-26 ASSESSMENT — PAIN - FUNCTIONAL ASSESSMENT: PAIN_FUNCTIONAL_ASSESSMENT: 0-10

## 2023-12-26 ASSESSMENT — PAIN SCALES - GENERAL: PAINLEVEL_OUTOF10: 0 - NO PAIN

## 2023-12-26 NOTE — Clinical Note
Angioplasty of the proximal left anterior descending lesion. Inflation 1: Pressure = 10 sparkle; Duration = 20 sec.

## 2023-12-26 NOTE — DISCHARGE INSTRUCTIONS
CARDIAC CATHETERIZATION DISCHARGE INSTRUCTIONS     **FOR SUDDEN AND SEVERE CHEST PAIN, SHORTNESS OF BREATH, EXCESSIVE BLEEDING, SIGNS OF STROKE, OR CHANGES IN MENTAL STATUS YOU SHOULD CALL 911 IMMEDIATELY.     Your provider has prescribed aspirin and clopidogrel (Plavix)DO NOT STOP THESE MEDICATIONS for any reason without talking to your cardiologist first. If any of these were prescribed, you must take them every day without missing a single dose. If you are getting low on these medications, contact your provider immediately for a refill.     FOR NEXT 24 HOURS    - Upon discharge, you should return home and rest for the remainder of the day and evening. You do not have to stay on bed rest but should not be very active.  It is recommended a responsible adult be with you for the first 24 hours after the procedure.    - No driving for 24 hours after procedure. Please arrange for someone to drive you home from the hospital today.     - Do not drive, operate machinery, or use power tools for 24 hours after your procedure.     - Do not make any legal decisions for 24 hours after your procedure.     - Do not drink alcoholic beverages for 24 hours after your procedure.    WOUND CARE     *FOR FEMORAL (LEG) ACCESS*    ·      No lifting, pushing or pulling over 10 pounds, no squatting or frequent bending, strenuous exercise for the next 7 days.  ·      No submerged bathing, swimming, or hot tubs for the next 7 days, or until fully healed.  ·      Avoid sexual activity for the next 7 days until any groin discomfort has ceased.    - The transparent dressing should be removed from the site 24 hours after the procedure. (*Dressing can come off 12/27/23 at 11:45 am). Wash the site gently with soap and water. Rinse well and pat dry. Keep the area clean and dry. You may apply a Band-Aid to the site. Avoid lotions, ointments, or powders until fully healed.     - You may shower the day after your procedure.      - It is normal  to notice a small bruise around the puncture site and/or a small grape sized or smaller lump. Any large bruising or large lump warrants a call to the office.     **If bleeding should occur, lay down and apply pressure to the affected area for 10 minutes.  If the bleeding stops notify your physician.  If there is a large amount of bleeding or spurting of blood CALL 911 immediately.  DO NOT drive yourself to the hospital.    **You may experience some tenderness, bruising or minimal inflammation.  If you have any concerns or if any of these symptoms become excessive, contact your cardiologist or go to the emergency room.     OTHER INSTRUCTIONS    - You may take acetaminophen (Tylenol) as directed for discomfort.  If pain is not relieved with acetaminophen (Tylenol), contact your doctor.    - If you notice or experience any of the following, you should notify your doctor or seek medical attention  Chest pain or discomfort  Change in mental status or weakness in extremities.  Dizziness, light headedness, or feeling faint.  Change in the site where the procedure was performed, such as bleeding or an increased area of bruising or swelling.  Tingling, numbness, pain, or coolness in the leg/arm beyond the site where the procedure was performed.  Signs of infection (i.e. shaking chills, temperature > 100 degrees Fahrenheit, warmth, redness) in the leg/arm area where the procedure was performed.  Changes in urination   Bloody or black stools  Vomiting blood  Severe nose bleeds  Any excessive bleeding    - If you DO NOT have an appointment with your cardiologist within 2-4 weeks following your procedure, please contact their office.

## 2023-12-26 NOTE — POST-PROCEDURE NOTE
Physician Transition of Care Summary  Invasive Cardiovascular Lab    Procedure Date: 12/26/2023  Attending:    * Colt Mojica - Primary  Resident/Fellow/Other Assistant: Surgeon(s) and Role:    Pre Procedure Diagnosis:   CAD, new onset angina, abnormal CT coronary angiogram    Post Procedure Diagnosis:   KINGS of proximal LAD, left ventricular ejection fraction 50%    Complications:   None    Stents/Implants:   KINGS of proximal LAD    Anticoagulation/Antiplatelet Plan:   Low-dose aspirin and Plavix    Estimated Blood Loss:   0 mL    Electronically signed by: Colt Mojica MD, 12/26/2023 11:32 AM    Anesthesia: Moderate                            anesthesia Staff: None

## 2023-12-26 NOTE — PROGRESS NOTES
Patient is post cardiac catheterization with subsequent angioplasty and placement of 2 drug-eluting stents to the proximal left anterior descending artery.  See Dr. Mojica's report for full summary.  Reviewed findings of procedure and pictures of coronary arteries with patient and his father-in-law who is at the bedside.  Pictures of coronary arteries were given to them.  Reviewed importance of medication compliance including dual antiplatelet therapy, coronary artery risk factor modification, need for cardiology follow-up.  Patient describes that he was recently diagnosed with sleep apnea and started on CPAP therapy several days ago which he was encouraged to continue and maintain compliance.  Follow-up with Dr. Wynn to be arranged.  They verbalized understanding of this information.

## 2023-12-26 NOTE — NURSING NOTE
Discharge instructions reviewed with pt and father which includes but not limited to:  s/s of groin bleeding/hematoma and what to do if it occurs, aspirin & Plavix strongly reinforced, RX pt ed info with possible side effects reviewed, cardiac rehab. Pt stated understanding and offered no questions.

## 2023-12-28 LAB
ACT BLD: 292 SEC (ref 89–169)
ATRIAL RATE: 62 BPM
ATRIAL RATE: 66 BPM
P AXIS: 51 DEGREES
P AXIS: 72 DEGREES
P OFFSET: 189 MS
P OFFSET: 198 MS
P ONSET: 130 MS
P ONSET: 147 MS
PR INTERVAL: 150 MS
PR INTERVAL: 180 MS
Q ONSET: 220 MS
Q ONSET: 222 MS
QRS COUNT: 10 BEATS
QRS COUNT: 11 BEATS
QRS DURATION: 100 MS
QRS DURATION: 94 MS
QT INTERVAL: 434 MS
QT INTERVAL: 442 MS
QTC CALCULATION(BAZETT): 448 MS
QTC CALCULATION(BAZETT): 454 MS
QTC FREDERICIA: 447 MS
QTC FREDERICIA: 448 MS
R AXIS: 26 DEGREES
R AXIS: 44 DEGREES
T AXIS: 38 DEGREES
T AXIS: 40 DEGREES
T OFFSET: 437 MS
T OFFSET: 443 MS
VENTRICULAR RATE: 62 BPM
VENTRICULAR RATE: 66 BPM

## 2023-12-31 DIAGNOSIS — F32.A DEPRESSION, UNSPECIFIED: ICD-10-CM

## 2023-12-31 DIAGNOSIS — F41.9 ANXIETY DISORDER, UNSPECIFIED: ICD-10-CM

## 2024-01-02 ENCOUNTER — TELEPHONE (OUTPATIENT)
Dept: CARDIOLOGY | Facility: CLINIC | Age: 54
End: 2024-01-02
Payer: COMMERCIAL

## 2024-01-02 ENCOUNTER — TELEPHONE (OUTPATIENT)
Dept: PRIMARY CARE | Facility: CLINIC | Age: 54
End: 2024-01-02
Payer: COMMERCIAL

## 2024-01-02 DIAGNOSIS — Z00.00 PREVENTATIVE HEALTH CARE: ICD-10-CM

## 2024-01-02 RX ORDER — OMEPRAZOLE 20 MG/1
20 CAPSULE, DELAYED RELEASE ORAL DAILY
Qty: 30 CAPSULE | Refills: 3 | Status: SHIPPED | OUTPATIENT
Start: 2024-01-02 | End: 2024-01-03

## 2024-01-02 RX ORDER — VENLAFAXINE HYDROCHLORIDE 150 MG/1
CAPSULE, EXTENDED RELEASE ORAL
Qty: 90 CAPSULE | Refills: 1 | Status: SHIPPED | OUTPATIENT
Start: 2024-01-02

## 2024-01-02 NOTE — TELEPHONE ENCOUNTER
1/2/24  Patient was called with physician orders and he verbalized understanding.  Claudia Rodriguez, CMA

## 2024-01-02 NOTE — TELEPHONE ENCOUNTER
Patient left voice mail stating he is about a week out from a heart cath and stents.  He is asking what his limitations are and asking what he is able to do regarding exercise.  Note to Dr. Wynn for review.  Claudia Rodriguez, [facility]

## 2024-01-02 NOTE — TELEPHONE ENCOUNTER
Pharmacy comment: Alternative Requested:DRUG INTERACTION WITH CLOPIDOGREL.    Received the above message from pharmacy regarding script for pantoprazole. Please advise

## 2024-01-02 NOTE — TELEPHONE ENCOUNTER
Impression: Presence of intraocular lens: Z96.1. Plan: IOL(s) positioned well.  Monitor with yearly dilated eye exam. Recommend refraction PRN Patient is calling because he had a heart cath with stents on 12/26/23 and the cardiac team advised him to not take the Diclofenac that he was taking for his arthritis because he is now on a blood thinner, Plavix. Since not taking it, his arthritis in his hands has flared up and he just wanted some advice from you on what he can take for the arthritis. Please advise  Thanks    Patient's # [phone]    Preferred pharmacy [facility] visit 11/29/23

## 2024-01-03 ENCOUNTER — TELEPHONE (OUTPATIENT)
Dept: CARDIOLOGY | Facility: CLINIC | Age: 54
End: 2024-01-03
Payer: COMMERCIAL

## 2024-01-03 RX ORDER — PANTOPRAZOLE SODIUM 40 MG/1
40 TABLET, DELAYED RELEASE ORAL
Qty: 90 TABLET | Refills: 0 | Status: SHIPPED | OUTPATIENT
Start: 2024-01-03 | End: 2024-02-19

## 2024-01-03 NOTE — TELEPHONE ENCOUNTER
Patient left voice mail stating he is about a week out from a heart cath and stents. He is having chest pressure not really pain. Pt would like to know if this is what is expected would like to know Dr. oTdd Wynn MD, FACC recommendations. Advised pt if any developing pain to seek ER. Bunny

## 2024-01-04 ENCOUNTER — APPOINTMENT (OUTPATIENT)
Dept: RADIOLOGY | Facility: HOSPITAL | Age: 54
End: 2024-01-04
Payer: COMMERCIAL

## 2024-01-04 ENCOUNTER — HOSPITAL ENCOUNTER (EMERGENCY)
Facility: HOSPITAL | Age: 54
Discharge: HOME | End: 2024-01-04
Attending: EMERGENCY MEDICINE
Payer: COMMERCIAL

## 2024-01-04 ENCOUNTER — APPOINTMENT (OUTPATIENT)
Dept: CARDIOLOGY | Facility: HOSPITAL | Age: 54
End: 2024-01-04
Payer: COMMERCIAL

## 2024-01-04 VITALS
SYSTOLIC BLOOD PRESSURE: 182 MMHG | HEIGHT: 69 IN | HEART RATE: 60 BPM | DIASTOLIC BLOOD PRESSURE: 101 MMHG | OXYGEN SATURATION: 99 % | BODY MASS INDEX: 31.1 KG/M2 | TEMPERATURE: 97.3 F | RESPIRATION RATE: 16 BRPM | WEIGHT: 210 LBS

## 2024-01-04 DIAGNOSIS — R07.9 CHEST PAIN, UNSPECIFIED TYPE: Primary | ICD-10-CM

## 2024-01-04 LAB
ALBUMIN SERPL BCP-MCNC: 4.7 G/DL (ref 3.4–5)
ALP SERPL-CCNC: 45 U/L (ref 33–120)
ALT SERPL W P-5'-P-CCNC: 209 U/L (ref 10–52)
ANION GAP SERPL CALC-SCNC: 12 MMOL/L (ref 10–20)
AST SERPL W P-5'-P-CCNC: 80 U/L (ref 9–39)
ATRIAL RATE: 64 BPM
BASOPHILS # BLD AUTO: 0.08 X10*3/UL (ref 0–0.1)
BASOPHILS NFR BLD AUTO: 1 %
BILIRUB SERPL-MCNC: 0.4 MG/DL (ref 0–1.2)
BNP SERPL-MCNC: 22 PG/ML (ref 0–99)
BUN SERPL-MCNC: 18 MG/DL (ref 6–23)
CALCIUM SERPL-MCNC: 9.2 MG/DL (ref 8.6–10.3)
CARDIAC TROPONIN I PNL SERPL HS: 5 NG/L (ref 0–20)
CARDIAC TROPONIN I PNL SERPL HS: 6 NG/L (ref 0–20)
CHLORIDE SERPL-SCNC: 103 MMOL/L (ref 98–107)
CO2 SERPL-SCNC: 23 MMOL/L (ref 21–32)
CREAT SERPL-MCNC: 1.16 MG/DL (ref 0.5–1.3)
EOSINOPHIL # BLD AUTO: 0.16 X10*3/UL (ref 0–0.7)
EOSINOPHIL NFR BLD AUTO: 1.9 %
ERYTHROCYTE [DISTWIDTH] IN BLOOD BY AUTOMATED COUNT: 13.4 % (ref 11.5–14.5)
GFR SERPL CREATININE-BSD FRML MDRD: 75 ML/MIN/1.73M*2
GLUCOSE SERPL-MCNC: 90 MG/DL (ref 74–99)
HCT VFR BLD AUTO: 47.6 % (ref 41–52)
HGB BLD-MCNC: 16 G/DL (ref 13.5–17.5)
IMM GRANULOCYTES # BLD AUTO: 0.02 X10*3/UL (ref 0–0.7)
IMM GRANULOCYTES NFR BLD AUTO: 0.2 % (ref 0–0.9)
LYMPHOCYTES # BLD AUTO: 1.78 X10*3/UL (ref 1.2–4.8)
LYMPHOCYTES NFR BLD AUTO: 21.3 %
MAGNESIUM SERPL-MCNC: 2.43 MG/DL (ref 1.6–2.4)
MCH RBC QN AUTO: 30.3 PG (ref 26–34)
MCHC RBC AUTO-ENTMCNC: 33.6 G/DL (ref 32–36)
MCV RBC AUTO: 90 FL (ref 80–100)
MONOCYTES # BLD AUTO: 0.58 X10*3/UL (ref 0.1–1)
MONOCYTES NFR BLD AUTO: 7 %
NEUTROPHILS # BLD AUTO: 5.72 X10*3/UL (ref 1.2–7.7)
NEUTROPHILS NFR BLD AUTO: 68.6 %
NRBC BLD-RTO: 0 /100 WBCS (ref 0–0)
P AXIS: 53 DEGREES
P OFFSET: 204 MS
P ONSET: 155 MS
PLATELET # BLD AUTO: 219 X10*3/UL (ref 150–450)
POTASSIUM SERPL-SCNC: 4 MMOL/L (ref 3.5–5.3)
PR INTERVAL: 136 MS
PROT SERPL-MCNC: 7.7 G/DL (ref 6.4–8.2)
Q ONSET: 223 MS
QRS COUNT: 11 BEATS
QRS DURATION: 94 MS
QT INTERVAL: 432 MS
QTC CALCULATION(BAZETT): 445 MS
QTC FREDERICIA: 441 MS
R AXIS: 17 DEGREES
RBC # BLD AUTO: 5.28 X10*6/UL (ref 4.5–5.9)
SODIUM SERPL-SCNC: 134 MMOL/L (ref 136–145)
T AXIS: 32 DEGREES
T OFFSET: 439 MS
VENTRICULAR RATE: 64 BPM
WBC # BLD AUTO: 8.3 X10*3/UL (ref 4.4–11.3)

## 2024-01-04 PROCEDURE — 83880 ASSAY OF NATRIURETIC PEPTIDE: CPT | Performed by: EMERGENCY MEDICINE

## 2024-01-04 PROCEDURE — 93005 ELECTROCARDIOGRAM TRACING: CPT

## 2024-01-04 PROCEDURE — 71045 X-RAY EXAM CHEST 1 VIEW: CPT | Performed by: RADIOLOGY

## 2024-01-04 PROCEDURE — 84484 ASSAY OF TROPONIN QUANT: CPT | Performed by: EMERGENCY MEDICINE

## 2024-01-04 PROCEDURE — 99283 EMERGENCY DEPT VISIT LOW MDM: CPT | Mod: 25

## 2024-01-04 PROCEDURE — 36415 COLL VENOUS BLD VENIPUNCTURE: CPT | Performed by: EMERGENCY MEDICINE

## 2024-01-04 PROCEDURE — 83735 ASSAY OF MAGNESIUM: CPT | Performed by: EMERGENCY MEDICINE

## 2024-01-04 PROCEDURE — 80053 COMPREHEN METABOLIC PANEL: CPT | Performed by: EMERGENCY MEDICINE

## 2024-01-04 PROCEDURE — 85025 COMPLETE CBC W/AUTO DIFF WBC: CPT | Performed by: EMERGENCY MEDICINE

## 2024-01-04 PROCEDURE — 71045 X-RAY EXAM CHEST 1 VIEW: CPT

## 2024-01-04 PROCEDURE — 99284 EMERGENCY DEPT VISIT MOD MDM: CPT | Performed by: EMERGENCY MEDICINE

## 2024-01-04 ASSESSMENT — LIFESTYLE VARIABLES
REASON UNABLE TO ASSESS: NO
HAVE YOU EVER FELT YOU SHOULD CUT DOWN ON YOUR DRINKING: NO
HAVE PEOPLE ANNOYED YOU BY CRITICIZING YOUR DRINKING: NO
EVER FELT BAD OR GUILTY ABOUT YOUR DRINKING: NO
EVER HAD A DRINK FIRST THING IN THE MORNING TO STEADY YOUR NERVES TO GET RID OF A HANGOVER: NO

## 2024-01-04 ASSESSMENT — PAIN SCALES - GENERAL: PAINLEVEL_OUTOF10: 0 - NO PAIN

## 2024-01-04 ASSESSMENT — HEART SCORE
RISK FACTORS: 1-2 RISK FACTORS
HEART SCORE: 2
ECG: NORMAL
TROPONIN: LESS THAN OR EQUAL TO NORMAL LIMIT
HISTORY: SLIGHTLY SUSPICIOUS
AGE: 45-64

## 2024-01-04 ASSESSMENT — COLUMBIA-SUICIDE SEVERITY RATING SCALE - C-SSRS
2. HAVE YOU ACTUALLY HAD ANY THOUGHTS OF KILLING YOURSELF?: NO
1. IN THE PAST MONTH, HAVE YOU WISHED YOU WERE DEAD OR WISHED YOU COULD GO TO SLEEP AND NOT WAKE UP?: NO
6. HAVE YOU EVER DONE ANYTHING, STARTED TO DO ANYTHING, OR PREPARED TO DO ANYTHING TO END YOUR LIFE?: NO

## 2024-01-04 ASSESSMENT — PAIN - FUNCTIONAL ASSESSMENT: PAIN_FUNCTIONAL_ASSESSMENT: 0-10

## 2024-01-04 ASSESSMENT — PAIN DESCRIPTION - DESCRIPTORS
DESCRIPTORS: TIGHTNESS
DESCRIPTORS: ACHING

## 2024-01-04 ASSESSMENT — PAIN DESCRIPTION - LOCATION: LOCATION: CHEST

## 2024-01-04 NOTE — ED PROVIDER NOTES
HPI   Chief Complaint   Patient presents with    Chest Pain     Stents placed end of December and having chest discomfort.       53-year-old male returns the emergency department, complains of left-sided chest discomfort, describes it as a mild pressure-like feeling.  States he was having this feeling previously, went to [facility] she worked him up and ended up referring him to outpatient cardiology.  12/26 had a catheterization done, found 80% occlusion of the LAD and had a stent placed.  Patient states he has been on Plavix since discharge, taking it regularly.  Patient states since the stent he is continue to have this same sensation on his left chest.  Again describes it as a pressure, tightness like feeling.  No worse or improvement with exertion or rest.  No associated nausea, diaphoresis or shortness of breath.    Reached out to the cardiology's office, they recommended he return to the emergency department.      History provided by:  Patient                      No data recorded                Patient History   Past Medical History:   Diagnosis Date    Anxiety     Depression     Encounter for general adult medical examination without abnormal findings 01/17/2022    Annual physical exam    Encounter for screening for malignant neoplasm of prostate 01/17/2022    Screening PSA (prostate specific antigen)    Hyperlipidemia     Hypertension     Personal history of malignant melanoma of skin     History of malignant melanoma of skin     Past Surgical History:   Procedure Laterality Date    CARDIAC CATHETERIZATION N/A 12/26/2023    Procedure: Left Heart Cath, With LV;  Surgeon: Colt Mojica MD;  Location: [facility];  Service: Cardiovascular;  Laterality: N/A;    CARDIAC CATHETERIZATION N/A 12/26/2023    Procedure: PCI KINGS Stent- Coronary;  Surgeon: Colt Mojica MD;  Location: [facility];  Service: Cardiovascular;  Laterality: N/A;    CT ANGIO CORONARY ART WITH HEARTFLOW  IF SCORE >30%  2023    CT ANGIO CORONARY ART WITH HEARTFLOW IF SCORE >30% 2023 [facility] [id number]    MALIGNANT SKIN LESION EXCISION Right     right lower leg melanoma removed    OTHER SURGICAL HISTORY  2022    Eye surgery     Family History   Problem Relation Name Age of Onset    Skin cancer Mother      Heart attack Father Kavon     Skin cancer Maternal Grandmother Renée     Heart attack Maternal Grandmother Renée     Heart attack Paternal Grandfather Jose      Social History     Tobacco Use    Smoking status: Former     Packs/day: 1.00     Years: 15.00     Additional pack years: 0.00     Total pack years: 15.00     Types: Cigarettes     Quit date:      Years since quittin.0    Smokeless tobacco: Never    Tobacco comments:     I like it but its horrible for health   Vaping Use    Vaping Use: Never used   Substance Use Topics    Alcohol use: Yes     Alcohol/week: 5.0 standard drinks of alcohol     Types: 4 Cans of beer, 1 Shots of liquor per week     Comment: in a typical week    Drug use: Yes     Frequency: 2.0 times per week     Types: Marijuana       Physical Exam   ED Triage Vitals [24 1048]   Temp Heart Rate Resp BP   36.3 °C (97.3 °F) 67 20 158/90      SpO2 Temp Source Heart Rate Source Patient Position   99 % Temporal Monitor --      BP Location FiO2 (%)     -- --       Physical Exam  Physical Exam:  Constitutional: Vitals noted, no distress. Afebrile.   Cardiovascular: Regular, rate, rhythm, no murmur.   Pulmonary: Lungs clear bilaterally with good aeration. No adventitious breath sounds.   Gastrointestinal: Soft, nonsurgical. Nontender. No peritoneal signs. Normoactive bowel sounds.   Musculoskeletal: No peripheral edema. Negative Homans bilaterally, no cords.   Skin: No rash.   Neuro: No focal neurologic deficits, NIH score of 0.    ED Course & MDM   Diagnoses as of 24 1306   Chest pain, unspecified type       Medical Decision Making    Labs Reviewed   COMPREHENSIVE  METABOLIC PANEL - Abnormal       Result Value    Glucose 90      Sodium 134 (*)     Potassium 4.0      Chloride 103      Bicarbonate 23      Anion Gap 12      Urea Nitrogen 18      Creatinine 1.16      eGFR 75      Calcium 9.2      Albumin 4.7      Alkaline Phosphatase 45      Total Protein 7.7      AST 80 (*)     Bilirubin, Total 0.4       (*)    MAGNESIUM - Abnormal    Magnesium 2.43 (*)    B-TYPE NATRIURETIC PEPTIDE - Normal    BNP 22      Narrative:        <100 pg/mL - Heart failure unlikely  100-299 pg/mL - Intermediate probability of acute heart                  failure exacerbation. Correlate with clinical                  context and patient history.    >=300 pg/mL - Heart Failure likely. Correlate with clinical                  context and patient history.    BNP testing is performed using different testing methodology at [facility] than at other [facility] hospitals. Direct result comparisons should only be made within the same method.      SERIAL TROPONIN-INITIAL - Normal    Troponin I, High Sensitivity 6      Narrative:     Less than 99th percentile of normal range cutoff-  Female and children under 18 years old <14 ng/L; Male <21 ng/L: Negative  Repeat testing should be performed if clinically indicated.     Female and children under 18 years old 14-50 ng/L; Male 21-50 ng/L:  Consistent with possible cardiac damage and possible increased clinical   risk. Serial measurements may help to assess extent of myocardial damage.     >50 ng/L: Consistent with cardiac damage, increased clinical risk and  myocardial infarction. Serial measurements may help assess extent of   myocardial damage.      NOTE: Children less than 1 year old may have higher baseline troponin   levels and results should be interpreted in conjunction with the overall   clinical context.     NOTE: Troponin I testing is performed using a different   testing methodology at [facility] than at other   system  [facility]. Direct result comparisons should only   be made within the same method.   SERIAL TROPONIN, 1 HOUR - Normal    Troponin I, High Sensitivity 5      Narrative:     Less than 99th percentile of normal range cutoff-  Female and children under 18 years old <14 ng/L; Male <21 ng/L: Negative  Repeat testing should be performed if clinically indicated.     Female and children under 18 years old 14-50 ng/L; Male 21-50 ng/L:  Consistent with possible cardiac damage and possible increased clinical   risk. Serial measurements may help to assess extent of myocardial damage.     >50 ng/L: Consistent with cardiac damage, increased clinical risk and  myocardial infarction. Serial measurements may help assess extent of   myocardial damage.      NOTE: Children less than 1 year old may have higher baseline troponin   levels and results should be interpreted in conjunction with the overall   clinical context.     NOTE: Troponin I testing is performed using a different   testing methodology at [facility] than at other   [facility]. Direct result comparisons should only   be made within the same method.   TROPONIN SERIES- (INITIAL, 1 HR)    Narrative:     The following orders were created for panel order Troponin I Series, High Sensitivity (0, 1 HR).  Procedure                               Abnormality         Status                     ---------                               -----------         ------                     Troponin I, High Sensiti...[[id number]]  Normal              Final result               Troponin, High Sensitivi...[[id number]]  Normal              Final result                 Please view results for these tests on the individual orders.   CBC WITH AUTO DIFFERENTIAL    WBC 8.3      nRBC 0.0      RBC 5.28      Hemoglobin 16.0      Hematocrit 47.6      MCV 90      MCH 30.3      MCHC 33.6      RDW 13.4      Platelets 219      Neutrophils % 68.6      Immature Granulocytes %, Automated 0.2       Lymphocytes % 21.3      Monocytes % 7.0      Eosinophils % 1.9      Basophils % 1.0      Neutrophils Absolute 5.72      Immature Granulocytes Absolute, Automated 0.02      Lymphocytes Absolute 1.78      Monocytes Absolute 0.58      Eosinophils Absolute 0.16      Basophils Absolute 0.08          XR chest 1 view   Final Result   No active disease in the chest.             Signed by: Vickey Meyers 1/4/2024 11:38 AM   Dictation workstation:   [id number]       \    EKG at 1042 with ventricular rate of 64, as read by me, shows normal sinus rhythm with normal axis normal interval, unremarkable ST and T wave pattern with no evidence of acute ischemia or other acute findings.    IV was established for laboratory workup, unremarkable CBC and metabolic panels, initial troponin 6, delta 5.    Patient describes a constant dull discomfort to the left chest, not improved or exacerbated by any specific factors.  States it was present before his stent was placed and continues to be consistent afterward.    With negative troponins x 2 not likely cardiac in nature.  Discussed close follow-up with both cardiology and primary care, return with any worsening symptoms or any additional concerns.        Shared SHARONDA Attestation:    This patient was seen by the advanced practice provider.  I personally saw the patient and made/approved the management plan and take responsibility for the patient management.    History: 53-year-old male presents with chest pain.    Exam: Regular rate and rhythm cardiac exam with clear breath sounds bilaterally.  Abdomen is soft and nontender.  Negative Homans' sign bilaterally.  Neurological exam is grossly intact.    MDM: ACS, arrhythmia, pleurisy, pneumonia, GERD    Cardiac evaluation including serial EKGs and troponins will be performed.  If these are negative, considering the fact that the patient had recent cardiac catheterization, the patient will be discharged home with short-term follow-up with  cardiology.  He may require additional workup and evaluation as an outpatient for further causes of his chest discomfort including possible referral to GI for endoscopy.    I have seen and examined the patient, agree with the workup, evaluation, medical decision making, management and diagnosis.  The care plan has been discussed.    Addison Ochoa MD      Procedure  Procedures     Kiki Armendariz, TRAM-CNP  01/04/24 [id number]

## 2024-01-04 NOTE — TELEPHONE ENCOUNTER
Called and spoke with the patient in regards to message. Patient verbalized understanding. Eric SANDERS

## 2024-01-05 ENCOUNTER — TRANSCRIBE ORDERS (OUTPATIENT)
Dept: CARDIAC REHAB | Facility: HOSPITAL | Age: 54
End: 2024-01-05
Payer: COMMERCIAL

## 2024-01-05 DIAGNOSIS — Z95.5 STATUS POST CORONARY ARTERY STENT PLACEMENT: Primary | ICD-10-CM

## 2024-01-11 ENCOUNTER — CLINICAL SUPPORT (OUTPATIENT)
Dept: CARDIAC REHAB | Facility: HOSPITAL | Age: 54
End: 2024-01-11
Payer: COMMERCIAL

## 2024-01-11 DIAGNOSIS — Z95.5 STATUS POST CORONARY ARTERY STENT PLACEMENT: ICD-10-CM

## 2024-01-11 NOTE — PROGRESS NOTES
Name: Alphonso King   : 1970   Diagnosis: HLD  MRN: [id number]   Onset Date: 23  Today's Date: 24      Cardiovascular   HX: stent  Family HX of CAD:  Yes  Angina: none  Describe:  Last Episode:resolved since stent placement  History of Heart Failure: No  EF: N/A  Onset of HF:  Last HF hospitalization:  Family HX of HF:  No  HF symptoms: None    Devices: None  HX of PAD: No    Arrythmias: normal sinus rhythm  Apical: regular  Heart Rate:83  BP:142/102  Radial pulses:  R Present 2+ L Present 2+    Comments:      Respiratory  HX: BENJI  Dyspnea:  No  Describe:  HX BENJI:  Yes  CPAP Use:  Yes  Family History of Lung Disease:  No    Resting O2 sat:  Lung Sounds: clear  Locations: all lobes    Comments:    Neurological   Orientation: oriented to person, place, time, and general circumstances  HX: none  History of stroke/TIA?:no    Comments:      Skin  Skin Color: normal, no cyanosis, jaundice, pallor or bruising  Edema: none    Comments:    Gastrointestinal/Genitourinary  HX: GERD  Comments:      Psychosocial  Marital status:   Children: 1  Lives alone:  No  Lives with:spouse  Drives:  No  Occupation: is employed full time as a  .  Occupational demands include infrequent no lifting   Caretaker of family member?:  No  Do you feel safe at home?:  No    Caffeinated drinks per day:2  Alcoholic drinks per day/week:2  HX drug or alcohol abuse: No  Current use of illicit drugs: Yes,cannabis     Comments:    Musculoskeletal  HX of injury/surgery:none    Comments:    Pain Assessment  Current pain:  none  Location:  Description:    Comments:    Fall Risk Assessment  Assistive device: no device  Needs assistance:  No  Afraid of falling:  No  Fall within the past 6 months?: No  Injured with fall:  Fall risk results: low        INDIVIDUAL CARDIAC TREATMENT PLAN-INITIAL ASSESSMENT     Name: Alphonso King   Today's Date: 24   : 1970    Primary Provider: Lauren  MRN: [id number]    Referring Physician:  Kingsley     Diagnosis: stent    Onset Date: 12-      Risk Stratification: Moderate      NUTRITION ASSESSMENT  Lipids: N/A  Lipid Lab Date:  Total Chol:  HDL:  LDL:  Trig:  Cholesterol Med: crestor    Diabetes: No  HbA1c:  Date Checked:  Monitors glucose at home: No  Fasting Blood Sugar Range:  Frequency:  Hypoglycemic Episode:    Weight Management  Weight: 211 lb  Height: 70 in  BMI:  30.3  Current Diet: Heart Healthy  Barriers to dietary change: None    Initial Dietary Assessment Score: TO BE GRADED  Discharge Dietary Assessment Score:       NUTRITION PLAN  Nutrition Goals:   1. Improve Picture Your Plate assessment results by discharge.  2. Make changes to diet to include heart healthy options while in the program.    Nutrition Intervention/Education:   Perform weekly weight checks on Wednesdays.   Encouraged to attend dietary lectures       OTHER CORE COMPONENTS/ RISK FACTORS ASSESSMENT  Medication compliance: good compliance  Using pill box: Yes  Carries medication list: Yes    Blood Pressure Management:  History of High BP: Yes  Resting BP:142/102    Tobacco: FORMER  Quit Date: 10 yrs ago  Anyone in the house smoke: No    Initial Knowledge Test Score: 11/15  Discharge Knowledge Test Score:    OTHER CORE COMPONENTS/ RISK FACTOR PLAN   Other Core components/Risk Factor Goals:                                                                                                                                                      1. Achieve and maintain a resting blood pressure less than 130/80 while in the program.  2. Gain knowledge of cardiac disease and lifestyle modifications related to exercise and ADL's prior to discharge.    Other Components/ Risk Factors Intervention/Education:  Will monitor HR, BP, dyspnea and arrhythmias each session.   Encouraged review of education materials.       PSYCHOSOCIAL ASSESSMENT  Patient reported stress level: moderate  Using stress management skills: Yes  HX of anxiety:  Yes  HX of depression: Yes  Patient questioned regarding any new stress, depression and anxiety symptoms: Yes    Family/Support System:wife  Seeing mental health provider: yes  Psychosocial medications: Buspar    Initial PHQ-9 score: TO BE GRADED  Discharge PHQ-9 score:   Was PHQ-9 faxed to provider: No  Date faxed:    Quality of Life Survey: TO BE GRADED  Pre PCS:  Post PCS:    Pre MCS:  Post MCS:      Stages of change:  Action    PSYCHOSOCIAL PLAN  Psychosocial Goals:  Maintain or lower PHQ-9 survey score   Identify personal stressors and begin strategies for managing stress by discharge     Psychosocial Interventions/ Education:  Encourage to attend stress management educational class       EXERCISE ASSESSMENT  Home Exercise: No  Frequency:   Mode:    EXERCISE PLAN  Exercise Goals:   To increase MET level by 5-10% each week   150 minutes/week of moderate intensity of aerobic exercise   Increase exercise frequency to 3-5 days per week     Exercise Prescription:   Based on Pre-rehab  Frequency: 2 days per week  Duration (total aerobic min.): 30-40 minutes  Intensity RPE: 11-14  Target HR:  MET Level Range:    Date of first exercise session: 1/17/24     Modality METS Load  Duration   1 Rest    05:00   2 Treadmill 2.6 2 mph  15:00   3 NuStep 2.2 36 gabriel 3 15:00   4 Cardiostrider 2.5 40 spm 3 15:00   5 Weights  5lb  06:00   6 Education    10:00   7 Rest    05:00     Exercise Intervention:   To increase MET level by 5-10% each week   Incorporate resistance training for muscular endurance and strength.  To increase total exercise duration to 30-45 minutes     LEARNING ASSESSMENT & BARRIERS  Readiness to Learn:  Barriers: None  Comments:    FALL RISK  moderate  Comments:        INDIVIDUAL PATIENT GOALS:  1.become healthy  2.prevent  further cardiac events        MEDICATIONS  Current Outpatient Medications   Medication Instructions    aspirin 81 mg, oral, Daily    busPIRone (Buspar) 10 mg tablet TAKE 1-2 TABLETS BY MOUTH  DAILY    clopidogrel (PLAVIX) 75 mg, oral, Daily    diclofenac (VOLTAREN) 75 mg, oral, Nightly PRN, Do not crush, chew, or split.    metoprolol succinate XL (TOPROL-XL) 25 mg, oral, Daily, Do not crush or chew.    nitroglycerin (NITROSTAT) 0.4 mg, sublingual, Every 5 min PRN    pantoprazole (PROTONIX) 40 mg, oral, Daily before breakfast    rosuvastatin (CRESTOR) 40 mg, oral, Daily    tadalafil 20 mg tablet TAKE 1 TABLET BY MOUTH 1 HOUR BEFORE ACTIVITY AS NEEDED    venlafaxine XR (Effexor-XR) 150 mg 24 hr capsule TAKE 1 CAPSULE BY MOUTH EVERY DAY                STAFF COMMENTS:

## 2024-01-12 ENCOUNTER — TRANSCRIBE ORDERS (OUTPATIENT)
Dept: CARDIAC REHAB | Facility: HOSPITAL | Age: 54
End: 2024-01-12
Payer: COMMERCIAL

## 2024-01-12 DIAGNOSIS — Z95.5 STATUS POST CORONARY ARTERY STENT PLACEMENT: Primary | ICD-10-CM

## 2024-01-15 ENCOUNTER — OFFICE VISIT (OUTPATIENT)
Dept: CARDIOLOGY | Facility: CLINIC | Age: 54
End: 2024-01-15
Payer: COMMERCIAL

## 2024-01-15 VITALS
BODY MASS INDEX: 31.75 KG/M2 | SYSTOLIC BLOOD PRESSURE: 124 MMHG | DIASTOLIC BLOOD PRESSURE: 72 MMHG | WEIGHT: 215 LBS | HEART RATE: 62 BPM

## 2024-01-15 DIAGNOSIS — R07.89 CHEST DISCOMFORT: ICD-10-CM

## 2024-01-15 DIAGNOSIS — R93.1 ABNORMAL CARDIAC CT ANGIOGRAPHY: ICD-10-CM

## 2024-01-15 DIAGNOSIS — R07.9 CHEST PAIN, UNSPECIFIED TYPE: ICD-10-CM

## 2024-01-15 DIAGNOSIS — E78.2 MIXED HYPERLIPIDEMIA: ICD-10-CM

## 2024-01-15 DIAGNOSIS — I25.119 ATHEROSCLEROTIC HEART DISEASE OF NATIVE CORONARY ARTERY WITH UNSPECIFIED ANGINA PECTORIS (CMS-HCC): ICD-10-CM

## 2024-01-15 DIAGNOSIS — R06.02 SHORTNESS OF BREATH: ICD-10-CM

## 2024-01-15 DIAGNOSIS — Z95.5 S/P DRUG ELUTING CORONARY STENT PLACEMENT: ICD-10-CM

## 2024-01-15 PROCEDURE — 1036F TOBACCO NON-USER: CPT | Performed by: INTERNAL MEDICINE

## 2024-01-15 PROCEDURE — 3008F BODY MASS INDEX DOCD: CPT | Performed by: INTERNAL MEDICINE

## 2024-01-15 PROCEDURE — 99214 OFFICE O/P EST MOD 30 MIN: CPT | Performed by: INTERNAL MEDICINE

## 2024-01-15 RX ORDER — CLOPIDOGREL BISULFATE 75 MG/1
75 TABLET ORAL DAILY
Qty: 100 TABLET | Refills: 2 | Status: SHIPPED | OUTPATIENT
Start: 2024-01-15 | End: 2025-01-14

## 2024-01-15 RX ORDER — ROSUVASTATIN CALCIUM 40 MG/1
40 TABLET, COATED ORAL DAILY
Qty: 100 TABLET | Refills: 2 | Status: SHIPPED | OUTPATIENT
Start: 2024-01-15 | End: 2025-01-14

## 2024-01-15 NOTE — PROGRESS NOTES
Patient:  Alphonso King  YOB: 1970  MRN: [id number]       HPI:       Alphonso King is a 53 y.o. male who returns today for cardiac follow-up.  He is accompanied by his wife.  He does not have any history of atherosclerotic heart or valvular heart disease.  He has a history of significant mixed hyperlipidemia.  No history of hypertension or diabetes mellitus.  He stopped smoking tobacco several years ago.  He relates a strongly positive family history for premature coronary artery disease.  His father had a myocardial infarction in his 40s.  His paternal grandparents also had myocardial infarctions at relatively young ages.  He works as an /educator at [employer]      He had an episode of chest pain on October 31, 2023.  He was awakened with a strong pressure-like discomfort.  After several minutes he was able to fall back asleep. A few hours later he woke up and still noted nonradiating pressure discomfort in his left upper chest.  No associated nausea, vomiting, diaphoresis, or shortness of breath.  He states he had been under quite a bit of stress at home.  His symptoms resolved spontaneously.  He was evaluated in the emergency department.  Cardiac enzymes were negative.  D-dimer was negative.  CBC and CMP were normal. Cholesterol was 263 with triglycerides 430, and HDL 41.  LDL unable to be calculated. EKG did not show any significant ST changes.  Chest x-ray did not show any active disease.       He was seen on November 22, 2023 and was scheduled for a coronary CT angiogram.  This study was completed on December 12, 2023 and showed an extensive amount of noncalcified plaque.  There was greater than 70% stenosis of the proximal LAD.  FFR was 0.64.  There was extensive outward remodeling of the plaque at the site of the stenosis.  There was 50% stenosis of the mid right PDA.  There was otherwise mild diffuse coronary artery disease.  Interestingly his coronary artery  calcium score was 0.  Cardiac cath by Dr. Mojica on December 26, 2023 showed 80% stenosis of the proximal LAD, less than 10% stenosis of the circumflex, and 10 to 30% stenosis of the RCA.  LV ejection fraction 50%.  He underwent angioplasty and stenting of the LAD.  He was evaluated in the emergency department January 4, 2024 for some mild chest pressure.  Initial blood pressure 158/90 mmHg.  Vital signs otherwise stable.  CBC and CMP normal except for sodium 134.  BNP was 22.  High-sensitivity troponin levels 5 and 6.  No significant ST changes.  He started on Prilosec 40 mg nightly and his symptoms have resolved.    He generally has been feeling well.  He denies any recurrent episodes of chest discomfort. He has mild exertional shortness of breath.  He denies any orthopnea, PND, or increasing peripheral edema.  He denies any palpitations, lightheadedness, near-syncope, or syncope.  He denies any fever, chills, or cough.  He denies any nausea, vomiting, or diaphoresis.  He denies any hemoptysis, hematemesis, melena, or hematochezia.  He plans to start cardiac rehab.  Other details as noted below.    The above portion of this note was dictated by me using voice recognition software.  I personally performed the services described in the documentation.  The scribe entering the documentation below was in my presence.  I affirm that the information is both accurate and complete.      Objective:     Vitals:    01/15/24 1449   BP: 124/72   Pulse: 62       Wt Readings from Last 4 Encounters:   01/15/24 97.5 kg (215 lb)   01/04/24 95.3 kg (210 lb)   12/26/23 94.8 kg (208 lb 15.9 oz)   12/19/23 97.1 kg (214 lb 1.6 oz)       Allergies:     No Known Allergies       Medications:     Current Outpatient Medications   Medication Instructions    aspirin 81 mg, oral, Daily    busPIRone (Buspar) 10 mg tablet TAKE 1-2 TABLETS BY MOUTH DAILY    clopidogrel (PLAVIX) 75 mg, oral, Daily    diclofenac (VOLTAREN) 75 mg, oral, Nightly  PRN, Do not crush, chew, or split.    metoprolol succinate XL (TOPROL-XL) 25 mg, oral, Daily, Do not crush or chew.    nitroglycerin (NITROSTAT) 0.4 mg, sublingual, Every 5 min PRN    pantoprazole (PROTONIX) 40 mg, oral, Daily before breakfast    rosuvastatin (CRESTOR) 40 mg, oral, Daily    venlafaxine XR (Effexor-XR) 150 mg 24 hr capsule TAKE 1 CAPSULE BY MOUTH EVERY DAY       Physical Examination:   GENERAL:  Well developed, well nourished, in no acute distress.  CHEST:  Symmetric and nontender.  NEURO/PSYCH:  Alert and oriented times three with approppriate behavior and responses.  NECK:  Supple, no JVD, no bruit.  LUNGS:  Clear to auscultation bilaterally, normal respiratory effort.  HEART:  Rate and rhythm regular with no evident murmur, no gallop appreciated.        There are no rubs, clicks or heaves.  EXTREMITIES:  Warm with good color, no clubbing or cyanosis.  There is no edema noted.  PERIPHERAL VASCULAR:  Pulses present and equally palpable; 2+ throughout.      Lab:     CBC:   Lab Results   Component Value Date    WBC 8.3 01/04/2024    RBC 5.28 01/04/2024    HGB 16.0 01/04/2024    HCT 47.6 01/04/2024     01/04/2024        CMP:    Lab Results   Component Value Date     (L) 01/04/2024    K 4.0 01/04/2024     01/04/2024    CO2 23 01/04/2024    BUN 18 01/04/2024    CREATININE 1.16 01/04/2024    GLUCOSE 90 01/04/2024    CALCIUM 9.2 01/04/2024       Magnesium:    Lab Results   Component Value Date    MG 2.43 (H) 01/04/2024       Lipid Profile:    Lab Results   Component Value Date    TRIG 430 (H) 04/12/2022    HDL 41.0 04/12/2022       BNP:   Lab Results   Component Value Date    BNP 22 01/04/2024        BMP:  Lab Results   Component Value Date     (L) 01/04/2024     12/26/2023     10/31/2023    K 4.0 01/04/2024    K 3.6 12/26/2023    K 3.9 10/31/2023     01/04/2024     12/26/2023     10/31/2023    CO2 23 01/04/2024    CO2 25 12/26/2023    CO2 26  10/31/2023    BUN 18 01/04/2024    BUN 22 12/26/2023    BUN 18 10/31/2023    CREATININE 1.16 01/04/2024    CREATININE 1.21 12/26/2023    CREATININE 1.13 10/31/2023       CBC:  Lab Results   Component Value Date    WBC 8.3 01/04/2024    WBC 7.2 12/26/2023    WBC 7.4 10/31/2023    RBC 5.28 01/04/2024    RBC 4.61 12/26/2023    RBC 5.16 10/31/2023    HGB 16.0 01/04/2024    HGB 13.9 12/26/2023    HGB 15.1 10/31/2023    HCT 47.6 01/04/2024    HCT 40.9 (L) 12/26/2023    HCT 46.2 10/31/2023    MCV 90 01/04/2024    MCV 89 12/26/2023    MCV 90 10/31/2023    MCH 30.3 01/04/2024    MCH 30.2 12/26/2023    MCH 29.3 10/31/2023    MCHC 33.6 01/04/2024    MCHC 34.0 12/26/2023    MCHC 32.7 10/31/2023    RDW 13.4 01/04/2024    RDW 13.4 12/26/2023    RDW 13.4 10/31/2023     01/04/2024     12/26/2023     10/31/2023    MPV 9.2 10/31/2023       Cardiac Enzymes:    Lab Results   Component Value Date    TROPHS 5 01/04/2024    TROPHS 6 01/04/2024    TROPHS 4 10/31/2023       Hepatic Function Panel:    Lab Results   Component Value Date    ALKPHOS 45 01/04/2024     (H) 01/04/2024    AST 80 (H) 01/04/2024    PROT 7.7 01/04/2024    BILITOT 0.4 01/04/2024       Diagnostic Studies:     XR chest 1 view    Result Date: 1/4/2024  Interpreted By:  Vickey Meyers, STUDY: XR CHEST 1 VIEW;  1/4/2024 11:25 am   INDICATION: Signs/Symptoms:Chest Pain.   COMPARISON: 10/31/2023   ACCESSION NUMBER(S): [id number]   ORDERING CLINICIAN: CADENCE ARGUETA   FINDINGS: The lungs appear clear without apparent pleural effusion. Possible cardiomegaly. No pulmonary vascular congestion.       No active disease in the chest.     Signed by: Vickey Meyers 1/4/2024 11:38 AM Dictation workstation:   [id number]    ECG 12 lead    Result Date: 1/4/2024  Normal sinus rhythm Normal ECG When compared with ECG of 04-JAN-2024 08:05, (unconfirmed) Nonspecific T wave abnormality no longer evident in Lateral leads See ED provider note for full interpretation  and clinical correlation Confirmed by Victor Hugo Dominguez ([id number]) on 1/4/2024 11:11:56 AM      Problem List:     Patient Active Problem List   Diagnosis    Achilles tendon pain    Anxiety and depression    History of melanoma    Mixed hyperlipidemia    Lump of skin of back    Pain of right upper extremity    Sebaceous cyst    Tendinitis of ankle    Tendinitis of elbow    Tendonitis    Chest pain    Shortness of breath    Family history of premature coronary artery disease    Chest discomfort    Abnormal cardiac CT angiography    BMI 30.0-30.9,adult    Former smoker       Asessment:     Problem List Items Addressed This Visit             ICD-10-CM    Mixed hyperlipidemia E78.2    Relevant Medications    rosuvastatin (Crestor) 40 mg tablet    Other Relevant Orders    Follow Up In Cardiology    [facility]    Comprehensive Metabolic Panel    Lipid Panel    Chest pain R07.9    Relevant Orders    Follow Up In Cardiology    [facility]    Comprehensive Metabolic Panel    Lipid Panel    Shortness of breath R06.02    Relevant Orders    Follow Up In Cardiology    [facility]    Comprehensive Metabolic Panel    Lipid Panel    Chest discomfort R07.89    Relevant Orders    Follow Up In Cardiology    [facility]    Comprehensive Metabolic Panel    Lipid Panel    Abnormal cardiac CT angiography R93.1    Relevant Orders    Follow Up In Cardiology    [facility]    Comprehensive Metabolic Panel    Lipid Panel     Other Visit Diagnoses         Codes    Atherosclerotic heart disease of native coronary artery with unspecified angina pectoris (CMS/[facility])     I25.119    Relevant Medications    clopidogrel (Plavix) 75 mg tablet    Other Relevant Orders    Follow Up In Cardiology    [facility]    Comprehensive Metabolic Panel    Lipid Panel    S/P drug eluting coronary stent placement     Z95.5    Relevant Medications    clopidogrel (Plavix) 75 mg tablet    Other Relevant Orders    Follow Up In Cardiology    [facility]    Comprehensive Metabolic Panel    Lipid Panel          Provider Attestation -  Scribe documentation    All medical record entries made by the Scribe were at my direction and personally dictated by me. I have reviewed the chart and agree that the record accurately reflects my personal performance of the history, physical exam, discussion and plan.

## 2024-01-17 ENCOUNTER — TELEPHONE (OUTPATIENT)
Dept: PRIMARY CARE | Facility: CLINIC | Age: 54
End: 2024-01-17
Payer: COMMERCIAL

## 2024-01-17 ENCOUNTER — APPOINTMENT (OUTPATIENT)
Dept: CARDIAC REHAB | Facility: HOSPITAL | Age: 54
End: 2024-01-17
Payer: COMMERCIAL

## 2024-01-17 NOTE — TELEPHONE ENCOUNTER
Patient is calling because he had a couple of stents put in around [place]. He was taking Diclofenac pills for his arthritis but now, because of the stents, he cannot take the Diclofenac orally. He wanted to know if there was a topical form of the medication that he could take or something similar. Please advise  Thanks    DOL visit with you 11/29/23    Patient's # [phone]      Preferred pharmacy [facility]

## 2024-01-19 NOTE — TELEPHONE ENCOUNTER
Patient is aware.    Tae Aguilar MD  [address] [facility] Clinical Support Staff6 minutes ago (1:33 PM)       Please let him know they do make Voltaren gel which she could apply to the knee several times a day.  The amount absorbed would be negligible and would be okay to use.  That being said there are also other arthritis creams that can be very effective and the pharmacist at his drugstore may be able to make some recommendations.  Please let him know

## 2024-01-22 ENCOUNTER — CLINICAL SUPPORT (OUTPATIENT)
Dept: CARDIAC REHAB | Facility: HOSPITAL | Age: 54
End: 2024-01-22
Payer: COMMERCIAL

## 2024-01-22 DIAGNOSIS — Z95.5 STATUS POST CORONARY ARTERY STENT PLACEMENT: ICD-10-CM

## 2024-01-22 PROCEDURE — 93798 PHYS/QHP OP CAR RHAB W/ECG: CPT | Performed by: INTERNAL MEDICINE

## 2024-01-24 ENCOUNTER — CLINICAL SUPPORT (OUTPATIENT)
Dept: CARDIAC REHAB | Facility: HOSPITAL | Age: 54
End: 2024-01-24
Payer: COMMERCIAL

## 2024-01-24 DIAGNOSIS — Z95.5 STATUS POST CORONARY ARTERY STENT PLACEMENT: ICD-10-CM

## 2024-01-24 PROCEDURE — 93798 PHYS/QHP OP CAR RHAB W/ECG: CPT | Performed by: INTERNAL MEDICINE

## 2024-01-29 ENCOUNTER — CLINICAL SUPPORT (OUTPATIENT)
Dept: CARDIAC REHAB | Facility: HOSPITAL | Age: 54
End: 2024-01-29
Payer: COMMERCIAL

## 2024-01-29 ENCOUNTER — TELEPHONE (OUTPATIENT)
Dept: PRIMARY CARE | Facility: CLINIC | Age: 54
End: 2024-01-29

## 2024-01-29 DIAGNOSIS — Z95.5 STATUS POST CORONARY ARTERY STENT PLACEMENT: ICD-10-CM

## 2024-01-29 PROCEDURE — 93798 PHYS/QHP OP CAR RHAB W/ECG: CPT | Performed by: INTERNAL MEDICINE

## 2024-01-29 NOTE — TELEPHONE ENCOUNTER
Patient is calling because he is trying to schedule an appointment with you but you don't have any openings until Friday 2/2/24. He did something to his wrists a few days ago, not work related but it's preventing him from working. Since he doesn't take Diclofenac anymore since having the stents at [place], he's not sure what he should be taking for the pain. He wanted to know if you could squeeze him in sometime this week or what do you advise?  He went to the urgent care in [facility] this past weekend but he wanted to check with you to see what he should do  Thanks      Patient's # [phone]

## 2024-01-30 ENCOUNTER — OFFICE VISIT (OUTPATIENT)
Dept: PRIMARY CARE | Facility: CLINIC | Age: 54
End: 2024-01-30
Payer: COMMERCIAL

## 2024-01-30 VITALS
SYSTOLIC BLOOD PRESSURE: 119 MMHG | HEIGHT: 69 IN | WEIGHT: 214.2 LBS | TEMPERATURE: 97.3 F | OXYGEN SATURATION: 99 % | RESPIRATION RATE: 18 BRPM | BODY MASS INDEX: 31.73 KG/M2 | DIASTOLIC BLOOD PRESSURE: 84 MMHG | HEART RATE: 70 BPM

## 2024-01-30 DIAGNOSIS — M15.9 OSTEOARTHRITIS OF MULTIPLE JOINTS, UNSPECIFIED OSTEOARTHRITIS TYPE: Primary | ICD-10-CM

## 2024-01-30 DIAGNOSIS — E78.2 MIXED HYPERLIPIDEMIA: ICD-10-CM

## 2024-01-30 DIAGNOSIS — M25.539 PAIN IN WRIST, UNSPECIFIED LATERALITY: ICD-10-CM

## 2024-01-30 PROCEDURE — 99213 OFFICE O/P EST LOW 20 MIN: CPT | Performed by: FAMILY MEDICINE

## 2024-01-30 PROCEDURE — 1036F TOBACCO NON-USER: CPT | Performed by: FAMILY MEDICINE

## 2024-01-30 PROCEDURE — 3008F BODY MASS INDEX DOCD: CPT | Performed by: FAMILY MEDICINE

## 2024-01-30 RX ORDER — METHYLPREDNISOLONE 4 MG/1
TABLET ORAL
Qty: 21 TABLET | Refills: 0 | Status: SHIPPED | OUTPATIENT
Start: 2024-01-30 | End: 2024-05-13 | Stop reason: ALTCHOICE

## 2024-01-30 RX ORDER — CELECOXIB 200 MG/1
200 CAPSULE ORAL 2 TIMES DAILY
Qty: 60 CAPSULE | Refills: 5 | Status: SHIPPED | OUTPATIENT
Start: 2024-01-30 | End: 2024-07-28

## 2024-01-30 NOTE — PROGRESS NOTES
"Subjective   Patient ID: Alphonso King is a 53 y.o. male who presents for Wrist Pain.  HPI    Patient presents in office today for wrist pain. Ongoing x 2-3 weeks. pain is 8/10. OTC tylenol arthritis. Injury, no. Patient admits that he dd have stents placed in his his heart on 12/26/23. Patient admits that he was placed on diclofenac. Patient admits that the other day he was using a tennis racket and he went to rotate his hand and felt pain. Patient admits that there is pain in his bilateral hands and pain in his left wrist.     Patient admits that he has noticed that his BP can be elevated at time. Patient has been checking it at home. Patient admits that when he is in pain his BP is up.     Taking current medications which were reviewed.  Problem list discussed.    Overall doing well.  Eating okay.  Staying active.    Has no other new problem /question.     ROS  Constitutional- No activity change. No appetite change.  Eyes- Denies vision changes.  Respiratory- No shortness of breath.  Cardiovascular- No palpitations. No chest pain.  GI- No nausea or vomiting. No diarrhea or constipation. Denies abdominal pain.  Musculoskeletal- Denies joint swelling.  Extremities- No edema.  Neurological- Denies headaches. Denies dizziness.  Skin- No rashes.  Psychiatric/Behavioral- Denies significant anxiety, or depressed mood.     Objective     /84   Pulse 70   Temp 36.3 °C (97.3 °F) (Temporal)   Resp 18   Ht 1.753 m (5' 9\")   Wt 97.2 kg (214 lb 3.2 oz)   SpO2 99%   BMI 31.63 kg/m²     No Known Allergies    Constitutional-- Well-nourished.  No distress  Head- unremarkable.  Eyes- PERRL.  Conjunctiva normal.  Nose- Normal.  No rhinorrhea noted.  Throat- Oropharynx is clear and moist.  Neck- Supple with no thyromegaly.  No significant cervical adenopathy noted.  Pulmonary/Chest- Breath sounds normal with normal effort.  No wheezing.  Heart- Regular rate and rhythm.  No murmur.  Abdomen- Soft and non-tender.  No masses " noted.  Musculoskeletal- Normal ROM.  OA changes hands and wrist noted.  Extremities- No edema.   Neurological- Alert.  No noted deficits.  Skin- Warm.  No rashes.  Psychiatric/Behavioral- Mood and affect normal.  Behavior normal.     Assessment/Plan   1. Osteoarthritis of multiple joints, unspecified osteoarthritis type  methylPREDNISolone (Medrol Dospak) 4 mg tablets    celecoxib (CeleBREX) 200 mg capsule      2. Pain in wrist, unspecified laterality        3. Mixed hyperlipidemia               Long talk. Treatment options reviewed.  Start Medrol Dosepak to be followed by Celebrex 1 daily occasionally 2 daily as needed to control symptoms.  Continue pantoprazole.  Risks of bleeding ulcer reviewed in detail however patient feels his arthritis is so significant that he needs something.  Did well on Voltaren in the past but will use Celebrex as it is less likely to cause GI issues    Continue and take your medications as prescribed.    Health Maintenance issues discussed.    Importance of healthy diet and regular exercise regimen discussed.      Follow-up as instructed or sooner if any problems or symptoms do not resolve as expected.

## 2024-01-31 ENCOUNTER — APPOINTMENT (OUTPATIENT)
Dept: CARDIAC REHAB | Facility: HOSPITAL | Age: 54
End: 2024-01-31
Payer: COMMERCIAL

## 2024-02-02 NOTE — PROGRESS NOTES
INDIVIDUAL CARDIAC TREATMENT PLAN- 30-DAY ASSESSMENT     Name: Alphonso King   Today's Date: 24   : 1970    Primary Provider: Lauren  MRN: [id number]    Referring Physician: Kingsley     Diagnosis: stent    Onset Date: 2023      Risk Stratification: Moderate      NUTRITION ASSESSMENT  Lipids: N/A  Lipid Lab Date:  Total Chol:  HDL:  LDL:  Trig:  Cholesterol Med: crestor    Diabetes: No  HbA1c:  Date Checked:  Monitors glucose at home: No  Fasting Blood Sugar Range:  Frequency:  Hypoglycemic Episode:    Weight Management  Weight: 211 lb  Height: 70 in  BMI:  30.3  Current Diet: Heart Healthy  Barriers to dietary change: None    Initial Dietary Assessment Score: TO BE GRADED  Discharge Dietary Assessment Score:       NUTRITION PLAN  Nutrition Goals:   1. Improve Picture Your Plate assessment results by discharge.  2. Make changes to diet to include heart healthy options while in the program.    Nutrition Intervention/Education:   Patient attended class with the dietitian, discussed food labels, portions, eating out tips, BMI, waist circumference, and calorie intake and outtake.   Patient obtains weight each week.        OTHER CORE COMPONENTS/ RISK FACTORS ASSESSMENT  Medication compliance: good compliance  Using pill box: Yes  Carries medication list: Yes    Blood Pressure Management:  History of High BP: Yes  Resting BP:131/85    Tobacco: FORMER  Quit Date: 10 yrs ago  Anyone in the house smoke: No    Initial Knowledge Test Score: 11/15  Discharge Knowledge Test Score:    OTHER CORE COMPONENTS/ RISK FACTOR PLAN   Other Core components/Risk Factor Goals:                                                                                                                                                      1. Achieve and maintain a resting blood pressure less than 130/80 while in the program.  2. Gain knowledge of cardiac disease and lifestyle modifications related to exercise and ADL's prior to  discharge.    Other Components/ Risk Factors Intervention/Education:  Will monitor HR, BP, dyspnea and arrhythmias each session.   Patient BP has been within normal limits.       PSYCHOSOCIAL ASSESSMENT  Patient reported stress level: moderate  Using stress management skills: Yes  HX of anxiety: Yes  HX of depression: Yes  Patient questioned regarding any new stress, depression and anxiety symptoms: Yes    Family/Support System:wife  Seeing mental health provider: yes  Psychosocial medications: Buspar    Initial PHQ-9 score: TO BE GRADED  Discharge PHQ-9 score:   Was PHQ-9 faxed to provider: No  Date faxed:    Quality of Life Survey: TO BE GRADED  Pre PCS:  Post PCS:    Pre MCS:  Post MCS:      Stages of change:  Action    PSYCHOSOCIAL PLAN  Psychosocial Goals:  Maintain or lower PHQ-9 survey score   Identify personal stressors and begin strategies for managing stress by discharge     Psychosocial Interventions/ Education:  Encourage to attend stress management educational class   Patient reports no new stressors at this time    EXERCISE ASSESSMENT  Home Exercise: No  Frequency:   Mode:    EXERCISE PLAN  Exercise Goals:   To increase MET level by 5-10% each week   150 minutes/week of moderate intensity of aerobic exercise   Increase exercise frequency to 3-5 days per week     Exercise Prescription:   Frequency: 2 days per week  Duration (total aerobic min.): 30-40 minutes  Intensity RPE: 11-14  Target HR:  MET Level Range:    Date of first exercise session: 1/17/24     Modality METS Load  Duration   1 Rest    05:00   2 Treadmill 2.4 1.8 mph  15:00   3 NuStep 2 32 gabriel 2 15:00   4 [employer]ex Bike 3.5 34 Gabriel 3 15:00   5 Weights  5lb  06:00   6 Education    10:00   7 Rest    05:00     Exercise Intervention:   Patient has been increasing on the equipment.  Patient has been restricted to minimal arm exercises due to tendonitis in his left wrist.  Will continue to increase as tolerated.      LEARNING ASSESSMENT &  "BARRIERS  Readiness to Learn:  Barriers: None  Comments:    FALL RISK  moderate  Comments:        INDIVIDUAL PATIENT GOALS:  1.become healthy  2.prevent  further cardiac events        MEDICATIONS  Current Outpatient Medications   Medication Instructions    aspirin 81 mg, oral, Daily    busPIRone (Buspar) 10 mg tablet TAKE 1-2 TABLETS BY MOUTH DAILY    celecoxib (CELEBREX) 200 mg, oral, 2 times daily    clopidogrel (PLAVIX) 75 mg, oral, Daily    methylPREDNISolone (Medrol Dospak) 4 mg tablets Take as directed on package.    metoprolol succinate XL (TOPROL-XL) 25 mg, oral, Daily, Do not crush or chew.    nitroglycerin (NITROSTAT) 0.4 mg, sublingual, Every 5 min PRN    pantoprazole (PROTONIX) 40 mg, oral, Daily before breakfast    rosuvastatin (CRESTOR) 40 mg, oral, Daily    venlafaxine XR (Effexor-XR) 150 mg 24 hr capsule TAKE 1 CAPSULE BY MOUTH EVERY DAY                STAFF COMMENTS: Patient has been increasing on equipment as tolerated. Patient reported to staff while he was on the [employer] bike that he has a slight \"twing\" in his chest on the left side, achy pain. He stated that it went away very quickly with rest. Patient stated that this feeling also happened when he was driving his son to school and went away very fast. He denied wanting to go to the ER and no other symptoms reported. A doctor notification was sent. Patient stated that he has tendonitis in his left wrist, will change exercises to accomidate his need. He has been engaging in class and attending educational classes. SPO2 has been 98% or greater on RA. Will continue with exercise and education.   "

## 2024-02-05 ENCOUNTER — CLINICAL SUPPORT (OUTPATIENT)
Dept: CARDIAC REHAB | Facility: HOSPITAL | Age: 54
End: 2024-02-05
Payer: COMMERCIAL

## 2024-02-05 DIAGNOSIS — Z95.5 STATUS POST CORONARY ARTERY STENT PLACEMENT: ICD-10-CM

## 2024-02-05 PROCEDURE — 93798 PHYS/QHP OP CAR RHAB W/ECG: CPT | Performed by: INTERNAL MEDICINE

## 2024-02-07 ENCOUNTER — APPOINTMENT (OUTPATIENT)
Dept: CARDIAC REHAB | Facility: HOSPITAL | Age: 54
End: 2024-02-07
Payer: COMMERCIAL

## 2024-02-12 ENCOUNTER — APPOINTMENT (OUTPATIENT)
Dept: CARDIAC REHAB | Facility: HOSPITAL | Age: 54
End: 2024-02-12
Payer: COMMERCIAL

## 2024-02-14 ENCOUNTER — APPOINTMENT (OUTPATIENT)
Dept: CARDIAC REHAB | Facility: HOSPITAL | Age: 54
End: 2024-02-14
Payer: COMMERCIAL

## 2024-02-18 DIAGNOSIS — Z00.00 PREVENTATIVE HEALTH CARE: ICD-10-CM

## 2024-02-19 ENCOUNTER — APPOINTMENT (OUTPATIENT)
Dept: CARDIAC REHAB | Facility: HOSPITAL | Age: 54
End: 2024-02-19
Payer: COMMERCIAL

## 2024-02-19 RX ORDER — PANTOPRAZOLE SODIUM 40 MG/1
40 TABLET, DELAYED RELEASE ORAL
Qty: 90 TABLET | Refills: 0 | Status: SHIPPED | OUTPATIENT
Start: 2024-02-19 | End: 2024-04-15

## 2024-02-21 ENCOUNTER — APPOINTMENT (OUTPATIENT)
Dept: CARDIAC REHAB | Facility: HOSPITAL | Age: 54
End: 2024-02-21
Payer: COMMERCIAL

## 2024-02-23 NOTE — PROGRESS NOTES
INDIVIDUAL CARDIAC TREATMENT PLAN-  DISCHARGE ASSESSMENT     Name: Alphonso King   Today's Date: 24   : 1970    Primary Provider: Lauren  MRN: [id number]    Referring Physician: Kingsley     Diagnosis: stent    Onset Date: 2023      Risk Stratification: Moderate      NUTRITION ASSESSMENT  Lipids: N/A  Lipid Lab Date:  Total Chol:  HDL:  LDL:  Trig:  Cholesterol Med: [facility]    Diabetes: No  HbA1c:  Date Checked:  Monitors glucose at home: No  Fasting Blood Sugar Range:  Frequency:  Hypoglycemic Episode:    Weight Management  Weight: 210.9 lb  Height: 70 in  BMI:  30.3  Current Diet: Heart Healthy  Barriers to dietary change: None    Initial Dietary Assessment Score: Not returned  Discharge Dietary Assessment Score: N/A      NUTRITION PLAN  Nutrition Goals:   1. Improve Picture Your Plate assessment results by discharge.  2. Make changes to diet to include heart healthy options while in the program.    Nutrition Intervention/Education:   Patient did not return his diet survey.  Patient was not in the program long enough to measure changes in goals or surveys.      OTHER CORE COMPONENTS/ RISK FACTORS ASSESSMENT  Medication compliance: good compliance  Using pill box: Yes  Carries medication list: Yes    Blood Pressure Management:  History of High BP: Yes  Resting BP: 122/77    Tobacco: FORMER  Quit Date: 10 yrs ago  Anyone in the house smoke: No    Initial Knowledge Test Score: 11/15  Discharge Knowledge Test Score: N/A    OTHER CORE COMPONENTS/ RISK FACTOR PLAN   Other Core components/Risk Factor Goals:                                                                                                                                                      1. Achieve and maintain a resting blood pressure less than 130/80 while in the program.  2. Gain knowledge of cardiac disease and lifestyle modifications related to exercise and ADL's prior to discharge.    Other Components/ Risk Factors  Intervention/Education:  Patient's blood pressure was within normal limits.   Patient was not in the program long enough to measure changes in goals or surveys.       PSYCHOSOCIAL ASSESSMENT  Patient reported stress level: moderate  Using stress management skills: Yes  HX of anxiety: Yes  HX of depression: Yes  Patient questioned regarding any new stress, depression and anxiety symptoms: Yes    Family/Support System:wife  Seeing mental health provider: yes  Psychosocial medications: Buspar    Initial PHQ-9 score: NOT RETURNED  Discharge PHQ-9 score: N/A  Was PHQ-9 faxed to provider: No  Date faxed:    Quality of Life Survey: NOT RETURNED  Pre PCS:  Post PCS:    Pre MCS:  Post MCS:      Stages of change:  Action    PSYCHOSOCIAL PLAN  Psychosocial Goals:  Maintain or lower PHQ-9 survey score   Identify personal stressors and begin strategies for managing stress by discharge     Psychosocial Interventions/ Education:  Patient reported he has tendonitis in his wrist and was slightly stressful due to having to wear a brace but mentioned it was getting better.   Patient was not in the program long enough to measure changes in goals or surveys.     EXERCISE ASSESSMENT  Home Exercise: No  Frequency:   Mode:    EXERCISE PLAN  Exercise Goals:   To increase MET level by 5-10% each week   150 minutes/week of moderate intensity of aerobic exercise   Increase exercise frequency to 3-5 days per week     Exercise Prescription:   Frequency: 2 days per week  Duration (total aerobic min.): 30-40 minutes  Intensity RPE: 11-14  Target HR:  MET Level Range: 2.4-3.5    Date of first exercise session: 1/17/24     Modality METS Load  Duration   1 Rest    05:00   2 Treadmill 2.4 1.8 mph  15:00   3 NuStep 2.6 62 gabriel 4 15:00   4 Cybex Bike 3.5 34 Gabriel 3 15:00   5 Weights  5lb  06:00   6 Education    10:00   7 Rest    05:00     Exercise Intervention:   Patient was increasing on equipment without complaint.   Patient stated that he was  exercising at home.   Patient was not in the program long enough to measure changes in goals or surveys.    LEARNING ASSESSMENT & BARRIERS  Readiness to Learn:  Barriers: None  Comments:    FALL RISK  moderate  Comments:        INDIVIDUAL PATIENT GOALS:  1.become healthy  2.prevent  further cardiac events        MEDICATIONS  Current Outpatient Medications   Medication Instructions    aspirin 81 mg, oral, Daily    busPIRone (Buspar) 10 mg tablet TAKE 1-2 TABLETS BY MOUTH DAILY    celecoxib (CELEBREX) 200 mg, oral, 2 times daily    clopidogrel (PLAVIX) 75 mg, oral, Daily    methylPREDNISolone (Medrol Dospak) 4 mg tablets Take as directed on package.    metoprolol succinate XL (TOPROL-XL) 25 mg, oral, Daily, Do not crush or chew.    nitroglycerin (NITROSTAT) 0.4 mg, sublingual, Every 5 min PRN    pantoprazole (PROTONIX) 40 mg, oral, Daily before breakfast    rosuvastatin (CRESTOR) 40 mg, oral, Daily    venlafaxine XR (Effexor-XR) 150 mg 24 hr capsule TAKE 1 CAPSULE BY MOUTH EVERY DAY                STAFF COMMENTS: Patient was increasing on equipment as tolerated. Patient reported to staff that his tendonitis was improving ans saw Dr. Aguilar and was told to wear a brace. He was given medrol dose pack by doctor. Patient has not attended class since 2/05/2024. He was engaging in class and attending educational classes. SPO2 was been 99% or greater on RA.

## 2024-04-14 DIAGNOSIS — Z00.00 PREVENTATIVE HEALTH CARE: ICD-10-CM

## 2024-04-15 RX ORDER — PANTOPRAZOLE SODIUM 40 MG/1
40 TABLET, DELAYED RELEASE ORAL
Qty: 90 TABLET | Refills: 0 | Status: SHIPPED | OUTPATIENT
Start: 2024-04-15

## 2024-05-01 ENCOUNTER — OFFICE VISIT (OUTPATIENT)
Dept: PRIMARY CARE | Facility: CLINIC | Age: 54
End: 2024-05-01
Payer: COMMERCIAL

## 2024-05-01 ENCOUNTER — APPOINTMENT (OUTPATIENT)
Dept: CARDIAC REHAB | Facility: HOSPITAL | Age: 54
End: 2024-05-01
Payer: COMMERCIAL

## 2024-05-01 VITALS
HEART RATE: 66 BPM | WEIGHT: 215.4 LBS | OXYGEN SATURATION: 98 % | TEMPERATURE: 98.5 F | DIASTOLIC BLOOD PRESSURE: 106 MMHG | HEIGHT: 69 IN | SYSTOLIC BLOOD PRESSURE: 138 MMHG | BODY MASS INDEX: 31.9 KG/M2

## 2024-05-01 DIAGNOSIS — Z12.5 SPECIAL SCREENING FOR MALIGNANT NEOPLASM OF PROSTATE: ICD-10-CM

## 2024-05-01 DIAGNOSIS — Z00.00 ROUTINE GENERAL MEDICAL EXAMINATION AT A HEALTH CARE FACILITY: Primary | ICD-10-CM

## 2024-05-01 DIAGNOSIS — E78.2 MIXED HYPERLIPIDEMIA: ICD-10-CM

## 2024-05-01 DIAGNOSIS — E66.09 CLASS 1 OBESITY DUE TO EXCESS CALORIES WITH SERIOUS COMORBIDITY AND BODY MASS INDEX (BMI) OF 31.0 TO 31.9 IN ADULT: ICD-10-CM

## 2024-05-01 DIAGNOSIS — I10 ESSENTIAL HYPERTENSION: ICD-10-CM

## 2024-05-01 PROBLEM — E66.811 CLASS 1 OBESITY DUE TO EXCESS CALORIES WITH SERIOUS COMORBIDITY AND BODY MASS INDEX (BMI) OF 31.0 TO 31.9 IN ADULT: Status: ACTIVE | Noted: 2024-05-01

## 2024-05-01 PROCEDURE — 3008F BODY MASS INDEX DOCD: CPT | Performed by: FAMILY MEDICINE

## 2024-05-01 PROCEDURE — 3075F SYST BP GE 130 - 139MM HG: CPT | Performed by: FAMILY MEDICINE

## 2024-05-01 PROCEDURE — 99396 PREV VISIT EST AGE 40-64: CPT | Performed by: FAMILY MEDICINE

## 2024-05-01 PROCEDURE — 3080F DIAST BP >= 90 MM HG: CPT | Performed by: FAMILY MEDICINE

## 2024-05-01 RX ORDER — BENAZEPRIL HYDROCHLORIDE 20 MG/1
20 TABLET ORAL DAILY
Qty: 30 TABLET | Refills: 5 | Status: SHIPPED | OUTPATIENT
Start: 2024-05-01 | End: 2024-05-08 | Stop reason: SDUPTHER

## 2024-05-01 ASSESSMENT — PATIENT HEALTH QUESTIONNAIRE - PHQ9
10. IF YOU CHECKED OFF ANY PROBLEMS, HOW DIFFICULT HAVE THESE PROBLEMS MADE IT FOR YOU TO DO YOUR WORK, TAKE CARE OF THINGS AT HOME, OR GET ALONG WITH OTHER PEOPLE: SOMEWHAT DIFFICULT
SUM OF ALL RESPONSES TO PHQ9 QUESTIONS 1 AND 2: 2
2. FEELING DOWN, DEPRESSED OR HOPELESS: SEVERAL DAYS
1. LITTLE INTEREST OR PLEASURE IN DOING THINGS: SEVERAL DAYS

## 2024-05-01 NOTE — PROGRESS NOTES
"Subjective   Patient ID: Alphonso King is a 53 y.o. male who presents for Hypertension and Annual Exam.  HPI  Patient presents today for an annual exam.    Also presents today for Hypertension. Noticed his BP was elevated yesterday, and today. States his BP today was 160's/110's. Denies dizziness, headaches, SOB, and chest pain.   In January he had some stents put in. Still sees Dr. Wynn. He is on Metoprolol, but isn't sure if this is entirely for his BP.         BP Readings from Last 3 Encounters:   05/01/24 (!) 138/106   01/30/24 119/84   01/15/24 124/72         Taking current medications which were reviewed.  Problem list discussed.    Overall doing well.  Eating okay.  Staying active.    Has no other new problem /question.     ROS  Constitutional- No activity change. No appetite change.  Eyes- Denies vision changes.  Respiratory- No shortness of breath.  Cardiovascular- No palpitations. No chest pain.  GI- No nausea or vomiting. No diarrhea or constipation. Denies abdominal pain.  Musculoskeletal- Denies joint swelling.  Extremities- No edema.  Neurological- Denies headaches. Denies dizziness.  Skin- No rashes.  Psychiatric/Behavioral- Denies significant anxiety, or depressed mood.     Objective     BP (!) 138/106   Pulse 66   Temp 36.9 °C (98.5 °F)   Ht 1.753 m (5' 9\")   Wt 97.7 kg (215 lb 6.4 oz)   SpO2 98%   BMI 31.81 kg/m²     No Known Allergies    Constitutional-- Well-nourished.  No distress  Head- unremarkable.  Eyes- PERRL.  Conjunctiva normal.  Nose- Normal.  No rhinorrhea noted.  Throat- Oropharynx is clear and moist.  Neck- Supple with no thyromegaly.  No significant cervical adenopathy noted.  Pulmonary/Chest- Breath sounds normal with normal effort.  No wheezing.  Heart- Regular rate and rhythm.  No murmur.  Abdomen- Soft and non-tender.  No masses noted.  Musculoskeletal- Normal ROM.  No significant joint swelling  Extremities- No edema.   Neurological- Alert.  No noted " deficits.  Psychiatric/Behavioral- Mood and affect normal.  Behavior normal.     Assessment/Plan   1. Routine general medical examination at a health care facility        2. Essential hypertension  DISCONTINUED: benazepril (Lotensin) 20 mg tablet      3. BMI 31.0-31.9,adult        4. Class 1 obesity due to excess calories with serious comorbidity and body mass index (BMI) of 31.0 to 31.9 in adult        5. Special screening for malignant neoplasm of prostate  PSA      6. Mixed hyperlipidemia               Long talk. Treatment options reviewed.    Reviewed most recent lab work with patient.  Labs done by his cardiologist.  Advised patient to remain up to date on routine maintenance and health screening.      Discussed hypertension.  Benazepril discussed.  Will continue to monitor.  Monitor blood pressure at home.  Nurse blood pressure check in a few weeks    Discussed importance of natural sources of nutrition.  Advised patient to consume vegetables, salads, fruits, nuts, and proteins such as fish and chicken.  Discussed portion control.      Continue and take your medications as prescribed.    Health Maintenance issues discussed.    Importance of healthy diet and regular exercise regimen discussed.    We will contact you with any test results ordered. If you do not hear from us, please contact.    Follow-up as instructed or sooner if any problems or symptoms do not resolve as expected.      Scribe Attestation  By signing my name below, INubia Scribe   attest that this documentation has been prepared under the direction and in the presence of Tae Aguilar MD.

## 2024-05-06 ENCOUNTER — APPOINTMENT (OUTPATIENT)
Dept: CARDIAC REHAB | Facility: HOSPITAL | Age: 54
End: 2024-05-06
Payer: COMMERCIAL

## 2024-05-08 ENCOUNTER — CLINICAL SUPPORT (OUTPATIENT)
Dept: PRIMARY CARE | Facility: CLINIC | Age: 54
End: 2024-05-08
Payer: COMMERCIAL

## 2024-05-08 ENCOUNTER — APPOINTMENT (OUTPATIENT)
Dept: CARDIAC REHAB | Facility: HOSPITAL | Age: 54
End: 2024-05-08
Payer: COMMERCIAL

## 2024-05-08 DIAGNOSIS — I10 ESSENTIAL HYPERTENSION: ICD-10-CM

## 2024-05-08 RX ORDER — BENAZEPRIL HYDROCHLORIDE 20 MG/1
30 TABLET ORAL DAILY
Qty: 45 TABLET | Refills: 5 | Status: SHIPPED | OUTPATIENT
Start: 2024-05-08

## 2024-05-08 NOTE — PROGRESS NOTES
Patient presents in office today for BP check   Patient is taking benazepril and metoprolol  Patient has been checking his BP at home.   BP at home has been running   Highest 150's-160's   Lowest 120's - 130's  At home BP machine today was 127/24 - the left arm  In office BP today was 120/88 - the right arm       BP Readings from Last 3 Encounters:   05/22/24 120/88   05/01/24 (!) 138/106   01/30/24 119/84         PER DR. PARKER - Check BP once a week.   Take 1.5 of benazepril 20 mg to equil 30 mg.   Follow up 2 weeks for nurse BP check.

## 2024-05-13 ENCOUNTER — APPOINTMENT (OUTPATIENT)
Dept: CARDIAC REHAB | Facility: HOSPITAL | Age: 54
End: 2024-05-13
Payer: COMMERCIAL

## 2024-05-15 ENCOUNTER — APPOINTMENT (OUTPATIENT)
Dept: CARDIAC REHAB | Facility: HOSPITAL | Age: 54
End: 2024-05-15
Payer: COMMERCIAL

## 2024-05-22 ENCOUNTER — CLINICAL SUPPORT (OUTPATIENT)
Dept: PRIMARY CARE | Facility: CLINIC | Age: 54
End: 2024-05-22
Payer: COMMERCIAL

## 2024-05-22 VITALS — DIASTOLIC BLOOD PRESSURE: 88 MMHG | SYSTOLIC BLOOD PRESSURE: 120 MMHG

## 2024-05-22 VITALS — SYSTOLIC BLOOD PRESSURE: 112 MMHG | DIASTOLIC BLOOD PRESSURE: 70 MMHG

## 2024-05-22 DIAGNOSIS — I10 ESSENTIAL HYPERTENSION: Primary | ICD-10-CM

## 2024-05-22 NOTE — PROGRESS NOTES
Patient presents today for a BP check. On 5-1-24 was seen, BP was 138/106. Benazepril 20 MG was started.  On 5-8-24 BP was 120/88. Benazepril was increased to 30 MG. Denies chest pain, palpitations, dizziness, SOB.  BP today: 112/70    Per Dr. Aguilar... Continue same medications, and see me in 2-3 months.

## 2024-06-16 DIAGNOSIS — F32.A DEPRESSION, UNSPECIFIED: ICD-10-CM

## 2024-06-16 DIAGNOSIS — F41.9 ANXIETY DISORDER, UNSPECIFIED: ICD-10-CM

## 2024-06-17 RX ORDER — VENLAFAXINE HYDROCHLORIDE 150 MG/1
CAPSULE, EXTENDED RELEASE ORAL
Qty: 90 CAPSULE | Refills: 1 | Status: SHIPPED | OUTPATIENT
Start: 2024-06-17

## 2024-07-15 ENCOUNTER — APPOINTMENT (OUTPATIENT)
Dept: CARDIOLOGY | Facility: CLINIC | Age: 54
End: 2024-07-15
Payer: COMMERCIAL

## 2024-07-27 DIAGNOSIS — F41.9 ANXIETY DISORDER, UNSPECIFIED: ICD-10-CM

## 2024-07-27 DIAGNOSIS — F32.A DEPRESSION, UNSPECIFIED: ICD-10-CM

## 2024-07-29 RX ORDER — BUSPIRONE HYDROCHLORIDE 10 MG/1
TABLET ORAL
Qty: 180 TABLET | Refills: 0 | Status: SHIPPED | OUTPATIENT
Start: 2024-07-29

## 2024-07-30 ENCOUNTER — APPOINTMENT (OUTPATIENT)
Dept: CARDIOLOGY | Facility: CLINIC | Age: 54
End: 2024-07-30
Payer: COMMERCIAL

## 2024-07-30 VITALS
BODY MASS INDEX: 30.81 KG/M2 | HEIGHT: 70 IN | WEIGHT: 215.2 LBS | SYSTOLIC BLOOD PRESSURE: 128 MMHG | DIASTOLIC BLOOD PRESSURE: 80 MMHG | HEART RATE: 71 BPM

## 2024-07-30 DIAGNOSIS — Z82.49 FAMILY HISTORY OF PREMATURE CORONARY ARTERY DISEASE: ICD-10-CM

## 2024-07-30 DIAGNOSIS — Z87.891 FORMER SMOKER: ICD-10-CM

## 2024-07-30 DIAGNOSIS — I10 ESSENTIAL HYPERTENSION: ICD-10-CM

## 2024-07-30 DIAGNOSIS — R07.9 CHEST PAIN, UNSPECIFIED TYPE: ICD-10-CM

## 2024-07-30 DIAGNOSIS — R07.89 CHEST DISCOMFORT: ICD-10-CM

## 2024-07-30 DIAGNOSIS — R93.1 ABNORMAL CARDIAC CT ANGIOGRAPHY: ICD-10-CM

## 2024-07-30 DIAGNOSIS — E78.2 MIXED HYPERLIPIDEMIA: ICD-10-CM

## 2024-07-30 DIAGNOSIS — R06.02 SHORTNESS OF BREATH: ICD-10-CM

## 2024-07-30 PROCEDURE — 3074F SYST BP LT 130 MM HG: CPT | Performed by: INTERNAL MEDICINE

## 2024-07-30 PROCEDURE — 3008F BODY MASS INDEX DOCD: CPT | Performed by: INTERNAL MEDICINE

## 2024-07-30 PROCEDURE — 3079F DIAST BP 80-89 MM HG: CPT | Performed by: INTERNAL MEDICINE

## 2024-07-30 PROCEDURE — 99214 OFFICE O/P EST MOD 30 MIN: CPT | Performed by: INTERNAL MEDICINE

## 2024-07-30 PROCEDURE — 1036F TOBACCO NON-USER: CPT | Performed by: INTERNAL MEDICINE

## 2024-07-30 RX ORDER — METOPROLOL SUCCINATE 50 MG/1
50 TABLET, EXTENDED RELEASE ORAL DAILY
Qty: 90 TABLET | Refills: 3 | Status: SHIPPED | OUTPATIENT
Start: 2024-07-30 | End: 2025-07-30

## 2024-07-30 NOTE — PROGRESS NOTES
Patient:  Alphonso King  YOB: 1970  MRN: [id number]       HPI:       Alphonso King is a 53 y.o. male who returns today for cardiac follow-up.   He does not have any history of atherosclerotic heart or valvular heart disease.  He has a history of significant mixed hyperlipidemia.  No history of hypertension or diabetes mellitus.  He stopped smoking tobacco several years ago.  He relates a strongly positive family history for premature coronary artery disease.  His father had a myocardial infarction in his 40s.  His paternal grandparents also had myocardial infarctions at relatively young ages.  He works as an /educator at [employer]      He had an episode of chest pain on October 31, 2023.  He was awakened with a strong pressure-like discomfort.  After several minutes he was able to fall back asleep. A few hours later he woke up and still noted nonradiating pressure discomfort in his left upper chest.  No associated nausea, vomiting, diaphoresis, or shortness of breath.  He states he had been under quite a bit of stress at home.  His symptoms resolved spontaneously.  He was evaluated in the emergency department.  Cardiac enzymes were negative.  D-dimer was negative.  CBC and CMP were normal. Cholesterol was 263 with triglycerides 430, and HDL 41.  LDL unable to be calculated. EKG did not show any significant ST changes.  Chest x-ray did not show any active disease.       He was seen on November 22, 2023 and was scheduled for a coronary CT angiogram.  This study was completed on December 12, 2023 and showed an extensive amount of noncalcified plaque.  There was greater than 70% stenosis of the proximal LAD.  FFR was 0.64.  There was extensive outward remodeling of the plaque at the site of the stenosis.  There was 50% stenosis of the mid right PDA.  There was otherwise mild diffuse coronary artery disease.  Interestingly his coronary artery calcium score was 0.  Cardiac cath  by Dr. Mojica on December 26, 2023 showed 80% stenosis of the proximal LAD, less than 10% stenosis of the circumflex, and 10 to 30% stenosis of the RCA.  LV ejection fraction 50%.  He underwent angioplasty and stenting of the LAD.  He was evaluated in the emergency department January 4, 2024 for some mild chest pressure.  Initial blood pressure 158/90 mmHg.  Vital signs otherwise stable.  CBC and CMP normal except for sodium 134.  BNP was 22.  High-sensitivity troponin levels 5 and 6.  No significant ST changes.  He was started on Prilosec 40 mg nightly and his symptoms resolved.     He has been doing well since his last visit.  He has mild chronic exertional shortness of breath.  He denies any chest discomfort.  He denies any orthopnea, PND, or increasing peripheral edema.  He denies any palpitations, lightheadedness, near-syncope, or syncope.  He denies any fever, chills, or cough.  He denies any nausea, vomiting, or diaphoresis.  He denies any hemoptysis, hematemesis, melena, or hematochezia.  He plans to start cardiac rehab. Lab studies dated January 4, 2024 showed a normal CBC and CMP with exception of sodium 134, AST 80, and .  BNP level 22.  Lipid profile April 12, 2022 showed a cholesterol 263 with HDL 41, and triglycerides 430.  LDL was not directly measured.  He is due to have follow-up labs drawn in the near future.  He is currently free of any anginal or CHF symptoms.  His blood pressures are well-controlled.  He is currently taking Toprol-XL 25 mg 1-1/2 tablets daily.  I advised him to increase this to one 50 mg tablet daily.  Other details as noted below.     The above portion of this note was dictated by me using voice recognition software.  I personally performed the services described in the documentation.  The scribe entering the documentation below was in my presence.  I affirm that the information is both accurate and complete.      Objective:     Vitals:    07/30/24 0939   BP:  128/80   Pulse: 71       Wt Readings from Last 4 Encounters:   07/30/24 97.6 kg (215 lb 3.2 oz)   05/01/24 97.7 kg (215 lb 6.4 oz)   01/30/24 97.2 kg (214 lb 3.2 oz)   01/15/24 97.5 kg (215 lb)       Allergies:     No Known Allergies       Medications:     Current Outpatient Medications   Medication Instructions    aspirin 81 mg, oral, Daily    benazepril (LOTENSIN) 30 mg, oral, Daily    busPIRone (Buspar) 10 mg tablet TAKE 1 TO 2 TABLETS BY MOUTH DAILY    clopidogrel (PLAVIX) 75 mg, oral, Daily    metoprolol succinate XL (TOPROL-XL) 25 mg, oral, Daily, Do not crush or chew.    nitroglycerin (NITROSTAT) 0.4 mg, sublingual, Every 5 min PRN    pantoprazole (PROTONIX) 40 mg, oral, Daily before breakfast    rosuvastatin (CRESTOR) 40 mg, oral, Daily    venlafaxine XR (Effexor-XR) 150 mg 24 hr capsule TAKE 1 CAPSULE BY MOUTH EVERY DAY       Physical Examination:   GENERAL:  Well developed, well nourished, in no acute distress.  CHEST:  Symmetric and nontender.  NEURO/PSYCH:  Alert and oriented times three with approppriate behavior and responses.  NECK:  Supple, no JVD, no bruit.  LUNGS:  Clear to auscultation bilaterally, normal respiratory effort.  HEART:  Rate and rhythm regular with no evident murmur, no gallop appreciated.        There are no rubs, clicks or heaves.  EXTREMITIES:  Warm with good color, no clubbing or cyanosis.  There is no edema noted.  PERIPHERAL VASCULAR:  Pulses present and equally palpable; 2+ throughout.      Lab:     CBC:   Lab Results   Component Value Date    WBC 8.3 01/04/2024    RBC 5.28 01/04/2024    HGB 16.0 01/04/2024    HCT 47.6 01/04/2024     01/04/2024        CMP:    Lab Results   Component Value Date     (L) 01/04/2024    K 4.0 01/04/2024     01/04/2024    CO2 23 01/04/2024    BUN 18 01/04/2024    CREATININE 1.16 01/04/2024    GLUCOSE 90 01/04/2024    CALCIUM 9.2 01/04/2024       Lipid Profile:    Lab Results   Component Value Date    TRIG 430 (H) 04/12/2022    HDL  41.0 04/12/2022       BMP:  Lab Results   Component Value Date     (L) 01/04/2024     12/26/2023     10/31/2023    K 4.0 01/04/2024    K 3.6 12/26/2023    K 3.9 10/31/2023     01/04/2024     12/26/2023     10/31/2023    CO2 23 01/04/2024    CO2 25 12/26/2023    CO2 26 10/31/2023    BUN 18 01/04/2024    BUN 22 12/26/2023    BUN 18 10/31/2023    CREATININE 1.16 01/04/2024    CREATININE 1.21 12/26/2023    CREATININE 1.13 10/31/2023       CBC:  Lab Results   Component Value Date    WBC 8.3 01/04/2024    WBC 7.2 12/26/2023    WBC 7.4 10/31/2023    RBC 5.28 01/04/2024    RBC 4.61 12/26/2023    RBC 5.16 10/31/2023    HGB 16.0 01/04/2024    HGB 13.9 12/26/2023    HGB 15.1 10/31/2023    HCT 47.6 01/04/2024    HCT 40.9 (L) 12/26/2023    HCT 46.2 10/31/2023    MCV 90 01/04/2024    MCV 89 12/26/2023    MCV 90 10/31/2023    MCH 30.3 01/04/2024    MCH 30.2 12/26/2023    MCH 29.3 10/31/2023    MCHC 33.6 01/04/2024    MCHC 34.0 12/26/2023    MCHC 32.7 10/31/2023    RDW 13.4 01/04/2024    RDW 13.4 12/26/2023    RDW 13.4 10/31/2023     01/04/2024     12/26/2023     10/31/2023    MPV 9.2 10/31/2023       Hepatic Function Panel:    Lab Results   Component Value Date    ALKPHOS 45 01/04/2024     (H) 01/04/2024    AST 80 (H) 01/04/2024    PROT 7.7 01/04/2024    BILITOT 0.4 01/04/2024       Magnesium:    Lab Results   Component Value Date    MG 2.43 (H) 01/04/2024     BNP:   Lab Results   Component Value Date    BNP 22 01/04/2024        Cardiac Enzymes:    Lab Results   Component Value Date    TROPHS 5 01/04/2024    TROPHS 6 01/04/2024    TROPHS 4 10/31/2023         Diagnostic Studies:     XR chest 1 view  Result Date: 1/4/2024    No active disease in the chest.     Signed by: Vickey Meyers 1/4/2024 11:38 AM Dictation workstation:   [id number]    ECG 12 lead  Result Date: 1/4/2024    Normal sinus rhythm Normal ECG When compared with ECG of 04-JAN-2024 08:05, (unconfirmed)  Nonspecific T wave abnormality no longer evident in Lateral leads See ED provider note for full interpretation and clinical correlation Confirmed by Victor Hugo Dominguez ([id number]) on 1/4/2024 11:11:56 AM      Problem List:     Patient Active Problem List   Diagnosis    Achilles tendon pain    Anxiety and depression    History of melanoma    Mixed hyperlipidemia    Lump of skin of back    Pain of right upper extremity    Sebaceous cyst    Tendinitis of ankle    Tendinitis of elbow    Tendonitis    Chest pain    Shortness of breath    Family history of premature coronary artery disease    Chest discomfort    Abnormal cardiac CT angiography    BMI 31.0-31.9,adult    Former smoker    Essential hypertension    Class 1 obesity due to excess calories with serious comorbidity and body mass index (BMI) of 31.0 to 31.9 in adult       Asessment:     Diagnoses and all orders for this visit:  Essential hypertension  Mixed hyperlipidemia  Family history of premature coronary artery disease  BMI 30.0-30.9,adult  Former smoker  Chest pain, unspecified type  -     metoprolol succinate XL (Toprol-XL) 50 mg 24 hr tablet; Take 1 tablet (50 mg) by mouth once daily. Do not crush or chew.  Shortness of breath  -     metoprolol succinate XL (Toprol-XL) 50 mg 24 hr tablet; Take 1 tablet (50 mg) by mouth once daily. Do not crush or chew.  Chest discomfort  -     metoprolol succinate XL (Toprol-XL) 50 mg 24 hr tablet; Take 1 tablet (50 mg) by mouth once daily. Do not crush or chew.  Abnormal cardiac CT angiography  -     metoprolol succinate XL (Toprol-XL) 50 mg 24 hr tablet; Take 1 tablet (50 mg) by mouth once daily. Do not crush or chew.

## 2024-07-30 NOTE — PATIENT INSTRUCTIONS
Continue same medications and treatments.   Patient educated on proper medication use.   Patient educated on risk factor modification.   Please bring any lab results from other providers / physicians to your next appointment.     Please bring all medicines, vitamins, and herbal supplements with you when you come to the office.     Prescriptions will not be filled unless you are compliant with your follow up appointments or have a follow up appointment scheduled as per instruction of your physician. Refills should be requested at the time of your visit.    INCREASE METOPROLOL SUCCINATE TO 50 MG DAILY    FOLLOW UP IN 6 MONTHS    ILyssa LPN, am scribing for and in the presence of Dr. Todd Wynn MD, FACC

## 2024-08-02 ENCOUNTER — APPOINTMENT (OUTPATIENT)
Dept: CARDIOLOGY | Facility: CLINIC | Age: 54
End: 2024-08-02
Payer: COMMERCIAL

## 2024-09-04 ENCOUNTER — APPOINTMENT (OUTPATIENT)
Dept: PRIMARY CARE | Facility: CLINIC | Age: 54
End: 2024-09-04
Payer: COMMERCIAL

## 2024-09-04 VITALS
WEIGHT: 215 LBS | SYSTOLIC BLOOD PRESSURE: 120 MMHG | DIASTOLIC BLOOD PRESSURE: 80 MMHG | RESPIRATION RATE: 18 BRPM | HEIGHT: 70 IN | HEART RATE: 79 BPM | BODY MASS INDEX: 30.78 KG/M2 | OXYGEN SATURATION: 98 %

## 2024-09-04 DIAGNOSIS — Z82.49 FAMILY HISTORY OF PREMATURE CORONARY ARTERY DISEASE: ICD-10-CM

## 2024-09-04 DIAGNOSIS — E78.2 MIXED HYPERLIPIDEMIA: ICD-10-CM

## 2024-09-04 DIAGNOSIS — F32.A ANXIETY AND DEPRESSION: ICD-10-CM

## 2024-09-04 DIAGNOSIS — F41.9 ANXIETY AND DEPRESSION: ICD-10-CM

## 2024-09-04 DIAGNOSIS — I10 ESSENTIAL HYPERTENSION: ICD-10-CM

## 2024-09-04 PROCEDURE — 3008F BODY MASS INDEX DOCD: CPT | Performed by: FAMILY MEDICINE

## 2024-09-04 PROCEDURE — 3074F SYST BP LT 130 MM HG: CPT | Performed by: FAMILY MEDICINE

## 2024-09-04 PROCEDURE — 1036F TOBACCO NON-USER: CPT | Performed by: FAMILY MEDICINE

## 2024-09-04 PROCEDURE — 99213 OFFICE O/P EST LOW 20 MIN: CPT | Performed by: FAMILY MEDICINE

## 2024-09-04 PROCEDURE — 3079F DIAST BP 80-89 MM HG: CPT | Performed by: FAMILY MEDICINE

## 2024-09-04 NOTE — PROGRESS NOTES
"Subjective   Patient ID: Alphonso King is a 53 y.o. male who presents for Hypertension and Hyperlipidemia.  HPI    HTN follow up  Denies chest pain,SOB, swelling, headaches, lightheadedness or dizziness.   Eats a generally healthy diet, Exercises.   Does not check BP at home.   Medication working well.   Patient states that a couple of days ago after spending all day in the sun he was experiencing chest pressure.     Taking current medications which were reviewed.  Problem list discussed.    Overall doing well.  Eating okay.  Staying active.    Has no other new problem /question.     ROS  Constitutional- No activity change. No appetite change.  Eyes- Denies vision changes.  Respiratory- No shortness of breath.  Cardiovascular- No palpitations. No chest pain.  GI- No nausea or vomiting. No diarrhea or constipation. Denies abdominal pain.  Musculoskeletal- Denies joint swelling.  Extremities- No edema.  Neurological- Denies headaches. Denies dizziness.  Skin- No rashes.  Psychiatric/Behavioral- Denies significant anxiety, or depressed mood.     Objective     /80   Pulse 79   Resp 18   Ht 1.778 m (5' 10\")   Wt 97.5 kg (215 lb)   SpO2 98%   BMI 30.85 kg/m²     No Known Allergies    Constitutional-- Well-nourished.  No distress  Head- unremarkable.  Eyes- PERRL.  Conjunctiva normal.  Nose- Normal.  No rhinorrhea noted.  Throat- Oropharynx is clear and moist.  Neck- Supple with no thyromegaly.  No significant cervical adenopathy noted.  Pulmonary/Chest- Breath sounds normal with normal effort.  No wheezing.  Heart- Regular rate and rhythm.  No murmur.  Abdomen- Soft and non-tender.  No masses noted.  Musculoskeletal- Normal ROM.  No significant joint swelling  Extremities- No edema.   Neurological- Alert.  No noted deficits.  Skin- Warm.  No rashes.  Psychiatric/Behavioral- Mood and affect normal.  Behavior normal.     Assessment/Plan   1. Mixed hyperlipidemia        2. Essential hypertension        3. Family " history of premature coronary artery disease        4. Anxiety and depression               Long talk. Treatment options reviewed.  Hypertension controlled.  GERD stable.  Continue and take your medications as prescribed.    Health Maintenance issues discussed.    Importance of healthy diet and regular exercise regimen discussed.    We will contact you with any test results ordered. If you do not hear from us, please contact.    Follow-up as instructed or sooner if any problems or symptoms do not resolve as expected.

## 2024-09-13 DIAGNOSIS — I10 ESSENTIAL HYPERTENSION: ICD-10-CM

## 2024-09-13 RX ORDER — BENAZEPRIL HYDROCHLORIDE 20 MG/1
30 TABLET ORAL DAILY
Qty: 135 TABLET | Refills: 1 | Status: SHIPPED | OUTPATIENT
Start: 2024-09-13

## 2024-09-29 DIAGNOSIS — Z00.00 PREVENTATIVE HEALTH CARE: ICD-10-CM

## 2024-10-01 RX ORDER — PANTOPRAZOLE SODIUM 40 MG/1
40 TABLET, DELAYED RELEASE ORAL
Qty: 90 TABLET | Refills: 1 | Status: SHIPPED | OUTPATIENT
Start: 2024-10-01

## 2024-11-18 DIAGNOSIS — F32.A DEPRESSION, UNSPECIFIED: ICD-10-CM

## 2024-11-18 DIAGNOSIS — F41.9 ANXIETY DISORDER, UNSPECIFIED: ICD-10-CM

## 2024-11-18 RX ORDER — BUSPIRONE HYDROCHLORIDE 10 MG/1
TABLET ORAL
Qty: 180 TABLET | Refills: 2 | Status: SHIPPED | OUTPATIENT
Start: 2024-11-18

## 2024-12-10 DIAGNOSIS — F32.A DEPRESSION, UNSPECIFIED: ICD-10-CM

## 2024-12-10 DIAGNOSIS — F41.9 ANXIETY DISORDER, UNSPECIFIED: ICD-10-CM

## 2024-12-10 RX ORDER — VENLAFAXINE HYDROCHLORIDE 150 MG/1
CAPSULE, EXTENDED RELEASE ORAL
Qty: 90 CAPSULE | Refills: 0 | Status: SHIPPED | OUTPATIENT
Start: 2024-12-10

## 2024-12-15 DIAGNOSIS — M15.9 OSTEOARTHRITIS OF MULTIPLE JOINTS, UNSPECIFIED OSTEOARTHRITIS TYPE: ICD-10-CM

## 2024-12-16 RX ORDER — CELECOXIB 200 MG/1
200 CAPSULE ORAL 2 TIMES DAILY
Qty: 60 CAPSULE | Refills: 2 | Status: SHIPPED | OUTPATIENT
Start: 2024-12-16

## 2025-01-08 ENCOUNTER — LAB (OUTPATIENT)
Dept: LAB | Facility: LAB | Age: 55
End: 2025-01-08
Payer: COMMERCIAL

## 2025-01-08 DIAGNOSIS — R07.9 CHEST PAIN, UNSPECIFIED TYPE: ICD-10-CM

## 2025-01-08 DIAGNOSIS — E78.2 MIXED HYPERLIPIDEMIA: ICD-10-CM

## 2025-01-08 DIAGNOSIS — R07.89 CHEST DISCOMFORT: ICD-10-CM

## 2025-01-08 DIAGNOSIS — Z95.5 S/P DRUG ELUTING CORONARY STENT PLACEMENT: ICD-10-CM

## 2025-01-08 DIAGNOSIS — Z12.5 SPECIAL SCREENING FOR MALIGNANT NEOPLASM OF PROSTATE: ICD-10-CM

## 2025-01-08 DIAGNOSIS — R06.02 SHORTNESS OF BREATH: ICD-10-CM

## 2025-01-08 DIAGNOSIS — R93.1 ABNORMAL CARDIAC CT ANGIOGRAPHY: ICD-10-CM

## 2025-01-08 DIAGNOSIS — I25.119 ATHEROSCLEROTIC HEART DISEASE OF NATIVE CORONARY ARTERY WITH UNSPECIFIED ANGINA PECTORIS: ICD-10-CM

## 2025-01-08 LAB
ALBUMIN SERPL BCP-MCNC: 4.8 G/DL (ref 3.4–5)
ALP SERPL-CCNC: 44 U/L (ref 33–120)
ALT SERPL W P-5'-P-CCNC: 22 U/L (ref 10–52)
ANION GAP SERPL CALC-SCNC: 9 MMOL/L (ref 10–20)
AST SERPL W P-5'-P-CCNC: 20 U/L (ref 9–39)
BILIRUB SERPL-MCNC: 0.4 MG/DL (ref 0–1.2)
BUN SERPL-MCNC: 22 MG/DL (ref 6–23)
CALCIUM SERPL-MCNC: 9.5 MG/DL (ref 8.6–10.3)
CHLORIDE SERPL-SCNC: 104 MMOL/L (ref 98–107)
CHOLEST SERPL-MCNC: 160 MG/DL (ref 0–199)
CHOLESTEROL/HDL RATIO: 3.6
CO2 SERPL-SCNC: 29 MMOL/L (ref 21–32)
CREAT SERPL-MCNC: 1.24 MG/DL (ref 0.5–1.3)
EGFRCR SERPLBLD CKD-EPI 2021: 69 ML/MIN/1.73M*2
ERYTHROCYTE [DISTWIDTH] IN BLOOD BY AUTOMATED COUNT: 13.5 % (ref 11.5–14.5)
GLUCOSE SERPL-MCNC: 90 MG/DL (ref 74–99)
HCT VFR BLD AUTO: 47.1 % (ref 41–52)
HDLC SERPL-MCNC: 44.8 MG/DL
HGB BLD-MCNC: 15.2 G/DL (ref 13.5–17.5)
LDLC SERPL CALC-MCNC: 63 MG/DL
MCH RBC QN AUTO: 29.5 PG (ref 26–34)
MCHC RBC AUTO-ENTMCNC: 32.3 G/DL (ref 32–36)
MCV RBC AUTO: 91 FL (ref 80–100)
NON HDL CHOLESTEROL: 115 MG/DL (ref 0–149)
NRBC BLD-RTO: 0 /100 WBCS (ref 0–0)
PLATELET # BLD AUTO: 220 X10*3/UL (ref 150–450)
POTASSIUM SERPL-SCNC: 4.4 MMOL/L (ref 3.5–5.3)
PROT SERPL-MCNC: 7.2 G/DL (ref 6.4–8.2)
PSA SERPL-MCNC: 0.29 NG/ML
RBC # BLD AUTO: 5.16 X10*6/UL (ref 4.5–5.9)
SODIUM SERPL-SCNC: 138 MMOL/L (ref 136–145)
TRIGL SERPL-MCNC: 263 MG/DL (ref 0–149)
VLDL: 53 MG/DL (ref 0–40)
WBC # BLD AUTO: 8 X10*3/UL (ref 4.4–11.3)

## 2025-01-08 PROCEDURE — G0103 PSA SCREENING: HCPCS

## 2025-01-08 PROCEDURE — 80061 LIPID PANEL: CPT

## 2025-01-08 PROCEDURE — 85027 COMPLETE CBC AUTOMATED: CPT

## 2025-01-08 PROCEDURE — 80053 COMPREHEN METABOLIC PANEL: CPT

## 2025-01-09 ENCOUNTER — TELEPHONE (OUTPATIENT)
Dept: PRIMARY CARE | Facility: CLINIC | Age: 55
End: 2025-01-09
Payer: COMMERCIAL

## 2025-01-09 NOTE — TELEPHONE ENCOUNTER
----- Message from Tae Aguilar sent at 1/9/2025  8:39 AM EST -----  Prostate blood test is within normal limits.  Please let him know

## 2025-01-29 ENCOUNTER — APPOINTMENT (OUTPATIENT)
Dept: CARDIOLOGY | Facility: CLINIC | Age: 55
End: 2025-01-29
Payer: COMMERCIAL

## 2025-01-29 VITALS
WEIGHT: 218 LBS | DIASTOLIC BLOOD PRESSURE: 74 MMHG | BODY MASS INDEX: 31.21 KG/M2 | SYSTOLIC BLOOD PRESSURE: 108 MMHG | HEIGHT: 70 IN | HEART RATE: 70 BPM

## 2025-01-29 DIAGNOSIS — I25.10 ATHEROSCLEROSIS OF NATIVE CORONARY ARTERY OF NATIVE HEART WITHOUT ANGINA PECTORIS: Primary | ICD-10-CM

## 2025-01-29 DIAGNOSIS — E78.2 MIXED HYPERLIPIDEMIA: ICD-10-CM

## 2025-01-29 DIAGNOSIS — I10 ESSENTIAL HYPERTENSION: ICD-10-CM

## 2025-01-29 DIAGNOSIS — Z87.891 FORMER SMOKER: ICD-10-CM

## 2025-01-29 DIAGNOSIS — Z82.49 FAMILY HISTORY OF PREMATURE CORONARY ARTERY DISEASE: ICD-10-CM

## 2025-01-29 DIAGNOSIS — Z98.61 HX OF PERCUTANEOUS TRANSLUMINAL CORONARY ANGIOPLASTY: ICD-10-CM

## 2025-01-29 PROCEDURE — 3078F DIAST BP <80 MM HG: CPT | Performed by: INTERNAL MEDICINE

## 2025-01-29 PROCEDURE — 3074F SYST BP LT 130 MM HG: CPT | Performed by: INTERNAL MEDICINE

## 2025-01-29 PROCEDURE — 99214 OFFICE O/P EST MOD 30 MIN: CPT | Performed by: INTERNAL MEDICINE

## 2025-01-29 PROCEDURE — 3008F BODY MASS INDEX DOCD: CPT | Performed by: INTERNAL MEDICINE

## 2025-01-29 NOTE — PROGRESS NOTES
Patient:  Alphonso King  YOB: 1970  MRN: [id number]       HPI:       Alphonso King is a 54 y.o. male who returns today for cardiac follow-up.   He does not have any history of atherosclerotic heart or valvular heart disease.  He has a history of significant mixed hyperlipidemia.  No history of hypertension or diabetes mellitus. He stopped smoking tobacco several years ago.  He relates a strongly positive family history for premature coronary artery disease.  His father had a myocardial infarction in his 40s.  His paternal grandparents had myocardial infarctions at relatively young ages.  He works as an /educator at [employer]      He had an episode of chest pain on October 31, 2023.  He was awakened with a strong pressure-like discomfort.  After several minutes he was able to fall back asleep. A few hours later he woke up and still noted nonradiating pressure discomfort in his left upper chest.  No associated nausea, vomiting, diaphoresis, or shortness of breath.  He had been under quite a bit of stress at home.  His symptoms resolved spontaneously.  He was evaluated in the emergency department.  Cardiac enzymes were negative.  D-dimer was negative.  CBC and CMP were normal. Cholesterol was 263 with triglycerides 430, and HDL 41.  LDL unable to be calculated. EKG did not show any significant ST changes.  Chest x-ray did not show any active disease.       He underwent a coronary CT angiogram on December 12, 2023.  The study showed an extensive amount of noncalcified plaque.  There was greater than 70% stenosis of the proximal LAD.  FFR was 0.64.  There was extensive outward remodeling of the plaque at the site of the stenosis.  There was 50% stenosis of the mid right PDA. There was otherwise mild diffuse coronary artery disease.  Interestingly, his coronary artery calcium score was 0.  Cardiac cath by Dr. Mojica on December 26, 2023 showed 80% stenosis of the proximal  LAD, less than 10% stenosis of the circumflex, and 10 to 30% stenosis of the RCA.  LV ejection fraction 50%.  He underwent angioplasty and stenting of the LAD.      He was re-evaluated in the emergency department January 4, 2024 for some mild chest pressure.  Initial blood pressure was 158/90 mmHg.  Vital signs otherwise stable.  CBC and CMP normal except for sodium 134.  BNP was 22.  High-sensitivity troponin levels 5 and 6.  No significant ST changes.  He was started on Prilosec 40 mg nightly and his symptoms resolved.     He continues to do well since his last visit.  He denies any chest pain or shortness of breath.  He denies any orthopnea, PND, or increasing peripheral edema.  He denies any palpitations, lightheadedness, near-syncope, or syncope.  He denies any fever, chills, or cough.  He denies any nausea, vomiting, or diaphoresis.  He denies any hemoptysis, hematemesis, melena, or hematochezia.  He plans to start cardiac rehab. Lab studies January 8, 2025 showed a normal CBC and CMP.  Cholesterol 160 with HDL 45, LDL 63, and triglycerides 263.  Previous triglyceride level was 430.  His PSA was normal.  He is currently free of any anginal or CHF symptoms.  His blood pressures are well-controlled.  He was advised to discontinue clopidogrel.  Continue aspirin, Toprol-XL, benazepril, and rosuvastatin.  Other details as noted below.     The above portion of this note was dictated by me using voice recognition software.  I personally performed the services described in the documentation.  The scribe entering the documentation below was in my presence.  I affirm that the information is both accurate and complete.      Objective:     Vitals:    01/29/25 1306   BP: 108/74   Pulse: 70       Wt Readings from Last 4 Encounters:   09/04/24 97.5 kg (215 lb)   07/30/24 97.6 kg (215 lb 3.2 oz)   05/01/24 97.7 kg (215 lb 6.4 oz)   01/30/24 97.2 kg (214 lb 3.2 oz)       Allergies:     No Known Allergies       Medications:      Current Outpatient Medications   Medication Instructions    aspirin 81 mg, oral, Daily    benazepril (LOTENSIN) 30 mg, oral, Daily    busPIRone (Buspar) 10 mg tablet TAKE 1 TO 2 TABLETS BY MOUTH DAILY    celecoxib (CELEBREX) 200 mg, oral, 2 times daily    clopidogrel (PLAVIX) 75 mg, oral, Daily    metoprolol succinate XL (TOPROL-XL) 50 mg, oral, Daily, Do not crush or chew.    nitroglycerin (NITROSTAT) 0.4 mg, sublingual, Every 5 min PRN    pantoprazole (PROTONIX) 40 mg, oral, Daily before breakfast    rosuvastatin (CRESTOR) 40 mg, oral, Daily    venlafaxine XR (Effexor-XR) 150 mg 24 hr capsule TAKE 1 CAPSULE BY MOUTH EVERY DAY       Physical Examination:   GENERAL:  Well developed, well nourished, in no acute distress.  CHEST:  Symmetric and nontender.  NEURO/PSYCH:  Alert and oriented times three with approppriate behavior and responses.  NECK:  Supple, no JVD, no bruit.  LUNGS:  Clear to auscultation bilaterally, normal respiratory effort.  HEART:  Rate and rhythm regular with no evident murmur, no gallop appreciated.        There are no rubs, clicks or heaves.  EXTREMITIES:  Warm with good color, no clubbing or cyanosis.  There is no edema noted.  PERIPHERAL VASCULAR:  Pulses present and equally palpable; 2+ throughout.      Lab:     CBC:   Lab Results   Component Value Date    WBC 8.0 01/08/2025    RBC 5.16 01/08/2025    HGB 15.2 01/08/2025    HCT 47.1 01/08/2025     01/08/2025        CMP:    Lab Results   Component Value Date     01/08/2025    K 4.4 01/08/2025     01/08/2025    CO2 29 01/08/2025    BUN 22 01/08/2025    CREATININE 1.24 01/08/2025    GLUCOSE 90 01/08/2025    CALCIUM 9.5 01/08/2025       Lipid Profile:    Lab Results   Component Value Date    TRIG 263 (H) 01/08/2025    HDL 44.8 01/08/2025    LDLCALC 63 01/08/2025       BMP:  Lab Results   Component Value Date     01/08/2025     (L) 01/04/2024     12/26/2023    K 4.4 01/08/2025    K 4.0 01/04/2024    K 3.6  12/26/2023     01/08/2025     01/04/2024     12/26/2023    CO2 29 01/08/2025    CO2 23 01/04/2024    CO2 25 12/26/2023    BUN 22 01/08/2025    BUN 18 01/04/2024    BUN 22 12/26/2023    CREATININE 1.24 01/08/2025    CREATININE 1.16 01/04/2024    CREATININE 1.21 12/26/2023       CBC:  Lab Results   Component Value Date    WBC 8.0 01/08/2025    WBC 8.3 01/04/2024    WBC 7.2 12/26/2023    RBC 5.16 01/08/2025    RBC 5.28 01/04/2024    RBC 4.61 12/26/2023    HGB 15.2 01/08/2025    HGB 16.0 01/04/2024    HGB 13.9 12/26/2023    HCT 47.1 01/08/2025    HCT 47.6 01/04/2024    HCT 40.9 (L) 12/26/2023    MCV 91 01/08/2025    MCV 90 01/04/2024    MCV 89 12/26/2023    MCH 29.5 01/08/2025    MCH 30.3 01/04/2024    MCH 30.2 12/26/2023    MCHC 32.3 01/08/2025    MCHC 33.6 01/04/2024    MCHC 34.0 12/26/2023    RDW 13.5 01/08/2025    RDW 13.4 01/04/2024    RDW 13.4 12/26/2023     01/08/2025     01/04/2024     12/26/2023    MPV 9.2 10/31/2023       Hepatic Function Panel:    Lab Results   Component Value Date    ALKPHOS 44 01/08/2025    ALT 22 01/08/2025    AST 20 01/08/2025    PROT 7.2 01/08/2025    BILITOT 0.4 01/08/2025     Mother  Magnesium:    Lab Results   Component Value Date    MG 2.43 (H) 01/04/2024       BNP:   Lab Results   Component Value Date    BNP 22 01/04/2024        Cardiac Enzymes:    Lab Results   Component Value Date    TROPHS 5 01/04/2024    TROPHS 6 01/04/2024    TROPHS 4 10/31/2023       Diagnostic Studies:     XR chest 1 view  Result Date: 1/4/2024    No active disease in the chest.     Signed by: Vickey Meyers 1/4/2024 11:38 AM Dictation workstation:   [id number]      ECG 12 lead  Result Date: 1/4/2024    Normal sinus rhythm Normal ECG When compared with ECG of 04-JAN-2024 08:05, (unconfirmed) Nonspecific T wave abnormality no longer evident in Lateral leads See ED provider note for full interpretation and clinical correlation Confirmed by Victor Hugo Dominguez ([id number]) on  1/4/2024 11:11:56 AM      Problem List:     Patient Active Problem List   Diagnosis    Achilles tendon pain    Anxiety and depression    History of melanoma    Mixed hyperlipidemia    Lump of skin of back    Pain of right upper extremity    Sebaceous cyst    Tendinitis of ankle    Tendinitis of elbow    Tendonitis    Chest pain    Shortness of breath    Family history of premature coronary artery disease    Chest discomfort    Abnormal cardiac CT angiography    BMI 30.0-30.9,adult    Former smoker    Essential hypertension    Class 1 obesity due to excess calories with serious comorbidity and body mass index (BMI) of 31.0 to 31.9 in adult       Asessment:     Problem List Items Addressed This Visit             ICD-10-CM    Mixed hyperlipidemia E78.2    Relevant Orders    Follow Up In Cardiology    [facility]    Lipid Panel    Comprehensive metabolic panel    Family history of premature coronary artery disease Z82.49    Relevant Orders    Follow Up In Cardiology    [facility]    Lipid Panel    Comprehensive metabolic panel    Former smoker Z87.891    Relevant Orders    Follow Up In Cardiology    [facility]    Lipid Panel    Comprehensive metabolic panel    Essential hypertension I10    Relevant Orders    Follow Up In Cardiology    [facility]    Lipid Panel    Comprehensive metabolic panel    Atherosclerosis of native coronary artery of native heart without angina pectoris - Primary I25.10    Relevant Orders    Follow Up In Cardiology    [facility]    Lipid Panel    Comprehensive metabolic panel    Hx of percutaneous transluminal coronary angioplasty Z98.61    Relevant Orders    Follow Up In Cardiology    [facility]    Lipid Panel    Comprehensive metabolic panel

## 2025-01-29 NOTE — PATIENT INSTRUCTIONS
PLEASE BRING ALL MEDICATION BOTTLES TO OFFICE VISITS     DISCONTINUE TAKING PLAVIX 75 MG     HAVE LABS DONE  BEFORE NEXT OFFICE VISIT (FASTING LABS)

## 2025-02-11 ENCOUNTER — TELEPHONE (OUTPATIENT)
Dept: PRIMARY CARE | Facility: CLINIC | Age: 55
End: 2025-02-11
Payer: COMMERCIAL

## 2025-03-08 DIAGNOSIS — I10 ESSENTIAL HYPERTENSION: ICD-10-CM

## 2025-03-10 RX ORDER — BENAZEPRIL HYDROCHLORIDE 20 MG/1
30 TABLET ORAL DAILY
Qty: 45 TABLET | Refills: 0 | Status: SHIPPED | OUTPATIENT
Start: 2025-03-10

## 2025-03-29 DIAGNOSIS — Z00.00 PREVENTATIVE HEALTH CARE: ICD-10-CM

## 2025-03-31 RX ORDER — PANTOPRAZOLE SODIUM 40 MG/1
40 TABLET, DELAYED RELEASE ORAL
Qty: 90 TABLET | Refills: 1 | Status: SHIPPED | OUTPATIENT
Start: 2025-03-31 | End: 2025-04-02 | Stop reason: SDUPTHER

## 2025-03-31 NOTE — TELEPHONE ENCOUNTER
Last seen 09/04/24. Patient needs an appointment. Please call to schedule, and let us know if a short supply is needed. Thank you!

## 2025-04-02 ENCOUNTER — TELEPHONE (OUTPATIENT)
Dept: PRIMARY CARE | Facility: CLINIC | Age: 55
End: 2025-04-02

## 2025-04-02 ENCOUNTER — APPOINTMENT (OUTPATIENT)
Dept: PRIMARY CARE | Facility: CLINIC | Age: 55
End: 2025-04-02
Payer: COMMERCIAL

## 2025-04-02 VITALS
WEIGHT: 214 LBS | BODY MASS INDEX: 30.64 KG/M2 | HEIGHT: 70 IN | SYSTOLIC BLOOD PRESSURE: 120 MMHG | DIASTOLIC BLOOD PRESSURE: 80 MMHG | RESPIRATION RATE: 18 BRPM

## 2025-04-02 DIAGNOSIS — F32.A DEPRESSION, UNSPECIFIED: ICD-10-CM

## 2025-04-02 DIAGNOSIS — F41.9 ANXIETY DISORDER, UNSPECIFIED: ICD-10-CM

## 2025-04-02 DIAGNOSIS — Z00.00 ANNUAL PHYSICAL EXAM: Primary | ICD-10-CM

## 2025-04-02 DIAGNOSIS — M72.2 PLANTAR FASCIITIS: ICD-10-CM

## 2025-04-02 DIAGNOSIS — E78.2 MIXED HYPERLIPIDEMIA: ICD-10-CM

## 2025-04-02 DIAGNOSIS — I10 ESSENTIAL HYPERTENSION: ICD-10-CM

## 2025-04-02 DIAGNOSIS — Z00.00 PREVENTATIVE HEALTH CARE: ICD-10-CM

## 2025-04-02 PROBLEM — E11.9 TYPE 2 DIABETES MELLITUS WITHOUT COMPLICATION, WITHOUT LONG-TERM CURRENT USE OF INSULIN: Status: RESOLVED | Noted: 2025-04-02 | Resolved: 2025-04-02

## 2025-04-02 PROBLEM — E11.9 TYPE 2 DIABETES MELLITUS WITHOUT COMPLICATION, WITHOUT LONG-TERM CURRENT USE OF INSULIN: Status: ACTIVE | Noted: 2025-04-02

## 2025-04-02 PROCEDURE — 1036F TOBACCO NON-USER: CPT | Performed by: FAMILY MEDICINE

## 2025-04-02 PROCEDURE — 99396 PREV VISIT EST AGE 40-64: CPT | Performed by: FAMILY MEDICINE

## 2025-04-02 PROCEDURE — 3008F BODY MASS INDEX DOCD: CPT | Performed by: FAMILY MEDICINE

## 2025-04-02 PROCEDURE — 3079F DIAST BP 80-89 MM HG: CPT | Performed by: FAMILY MEDICINE

## 2025-04-02 PROCEDURE — 3074F SYST BP LT 130 MM HG: CPT | Performed by: FAMILY MEDICINE

## 2025-04-02 RX ORDER — VENLAFAXINE HYDROCHLORIDE 150 MG/1
150 CAPSULE, EXTENDED RELEASE ORAL DAILY
Qty: 90 CAPSULE | Refills: 3 | Status: SHIPPED | OUTPATIENT
Start: 2025-04-02

## 2025-04-02 RX ORDER — PANTOPRAZOLE SODIUM 40 MG/1
40 TABLET, DELAYED RELEASE ORAL
Qty: 90 TABLET | Refills: 3 | Status: SHIPPED | OUTPATIENT
Start: 2025-04-02

## 2025-04-02 RX ORDER — METHYLPREDNISOLONE 4 MG/1
TABLET ORAL
Qty: 21 TABLET | Refills: 0 | Status: SHIPPED | OUTPATIENT
Start: 2025-04-02

## 2025-04-02 NOTE — PROGRESS NOTES
"Subjective   Patient ID: Alphonso King is a 54 y.o. male who presents for Hypertension and Annual Exam.  HPI    Patient presents in office today for an Annual physical. Last eye exam 1 year ago, last dental exam 1 week ago. No new family h/o of cancer or heart disease. Does not need paperwork filled out today.      HTN follow up  Denies chest pain,SOB, swelling, headaches, lightheadedness or dizziness.   Eats a generally healthy diet, Exercises.   Does not check BP at home.   Medication working well.      Taking current medications which were reviewed.  Problem list discussed.    Overall doing well.  Eating okay.  Staying active.    Has no other new problem /question.     ROS  Constitutional- No activity change. No appetite change.  Eyes- Denies vision changes.  Respiratory- No shortness of breath.  Cardiovascular- No palpitations. No chest pain.  GI- No nausea or vomiting. No diarrhea or constipation. Denies abdominal pain.  Musculoskeletal- Denies joint swelling.  Extremities- No edema.  Neurological- Denies headaches. Denies dizziness.  Skin- No rashes.  Psychiatric/Behavioral- Denies significant anxiety, or depressed mood.     Objective     /80   Resp 18   Ht 1.778 m (5' 10\")   Wt 97.1 kg (214 lb)   BMI 30.71 kg/m²     No Known Allergies    Constitutional-- Well-nourished.  No distress  Head- unremarkable.  Eyes- PERRL.  Conjunctiva normal.  Nose- Normal.  No rhinorrhea noted.  Throat- Oropharynx is clear and moist.  Neck- Supple with no thyromegaly.  No significant cervical adenopathy noted.  Pulmonary/Chest- Breath sounds normal with normal effort.  No wheezing.  Heart- Regular rate and rhythm.  No murmur.  Abdomen- Soft and non-tender.  No masses noted.  Musculoskeletal- Normal ROM.  No significant joint swelling.  Mild pain bottom of left heel consistent with plantar fasciitis.  Extremities- No edema.   Neurological- Alert.  No noted deficits.  Skin- Warm.  No rashes.  Psychiatric/Behavioral- Mood and " affect normal.  Behavior normal.     Assessment/Plan   1. Annual physical exam        2. Essential hypertension        3. Mixed hyperlipidemia        4. Anxiety disorder, unspecified  venlafaxine XR (Effexor-XR) 150 mg 24 hr capsule      5. Depression, unspecified  venlafaxine XR (Effexor-XR) 150 mg 24 hr capsule      6. Preventative health care  pantoprazole (ProtoNix) 40 mg EC tablet      7. Plantar fasciitis  methylPREDNISolone (Medrol Dospak) 4 mg tablets             Long talk. Treatment options reviewed.    Reviewed most recent lab work with patient. Advised patient to remain up to date on routine maintenance and health screening.      I have counseled the patient on anxiety and depression. Denies suicidal ideation or homicidal ideation. The patient does not wish to see the counselor for counseling    Discussed plantar fasciitis care and management. Take Methylprednisolone as discussed.  Talk wearing tight shoes indoors along with stretching exercises.  Continue to monitor.     Hypertension controlled.     Discussed importance of natural sources of nutrition.  Advised patient to consume vegetables, salads, fruits, nuts, and proteins such as fish and chicken.  Discussed portion control.      Discussed the importance of routine stretching and exercise.     Continue and take your medications as prescribed.    Health Maintenance issues discussed.    Importance of healthy diet and regular exercise regimen discussed.    We will contact you with any test results ordered. If you do not hear from us, please contact.    Follow-up as instructed or sooner if any problems or symptoms do not resolve as expected.      Scribe Attestation  By signing my name below, Nubia VILLASENOR Scribe   attest that this documentation has been prepared under the direction and in the presence of Tae Aguilar MD.

## 2025-04-02 NOTE — TELEPHONE ENCOUNTER
Please advise     Alphonso King [id number] [address] Clinical Support Staff  Phone Number: [phone]     I was just in  today (Wednesday) and forgot to ask Dr. Aguilar - Our family is going to go to [place] and [place] next month. Are there any vaccinations he would suggest before we go?

## 2025-04-04 DIAGNOSIS — I10 ESSENTIAL HYPERTENSION: ICD-10-CM

## 2025-04-04 RX ORDER — BENAZEPRIL HYDROCHLORIDE 20 MG/1
30 TABLET ORAL DAILY
Qty: 135 TABLET | Refills: 1 | Status: SHIPPED | OUTPATIENT
Start: 2025-04-04

## 2025-04-29 ENCOUNTER — CLINICAL SUPPORT (OUTPATIENT)
Dept: INFECTIOUS DISEASES | Facility: CLINIC | Age: 55
End: 2025-04-29
Payer: COMMERCIAL

## 2025-04-29 DIAGNOSIS — Z71.84 COUNSELING FOR TRAVEL: Primary | ICD-10-CM

## 2025-04-29 PROCEDURE — 90632 HEPA VACCINE ADULT IM: CPT | Performed by: INTERNAL MEDICINE

## 2025-04-29 PROCEDURE — 99202U03 TRAVEL CONSULT (U03): Performed by: INTERNAL MEDICINE

## 2025-04-29 PROCEDURE — 90471 IMMUNIZATION ADMIN: CPT | Performed by: INTERNAL MEDICINE

## 2025-04-29 RX ORDER — AZITHROMYCIN 500 MG/1
500 TABLET, FILM COATED ORAL DAILY
Qty: 3 TABLET | Refills: 0 | Status: SHIPPED | OUTPATIENT
Start: 2025-04-29 | End: 2025-05-02

## 2025-04-29 RX ORDER — LOPERAMIDE HYDROCHLORIDE 2 MG/1
CAPSULE ORAL
Qty: 12 CAPSULE | Refills: 1 | Status: SHIPPED | OUTPATIENT
Start: 2025-04-29

## 2025-04-29 ASSESSMENT — PATIENT HEALTH QUESTIONNAIRE - PHQ9
SUM OF ALL RESPONSES TO PHQ9 QUESTIONS 1 AND 2: 0
2. FEELING DOWN, DEPRESSED OR HOPELESS: NOT AT ALL
1. LITTLE INTEREST OR PLEASURE IN DOING THINGS: NOT AT ALL

## 2025-04-29 NOTE — PATIENT INSTRUCTIONS
You were seen today for your upcoming trip.    For your country specific issues, please refer back to the [employer] handouts supplied to you in the travel brochure folder that we provided to you at your visit.  These are updated daily, if necessary, by the reporting agencies, so are a valuable source of information for your trip.    Please check the COVID-19 entry requirements for the countries that you are visiting as the entry and exit requirements vary.    This brief summary may not contain all of the items that we discussed today.  Please review the handouts given to you as well.    ** Please be sure to carry any medications with you in your carry-on luggage in the event that your luggage is delayed or lost during your travels.    ** For your trip, as we discussed, standard food and water precautions may apply.     You should avoid drinking any water that is not bottled.  Alcoholic or carbonated beverages are safe to drink.  Any beverage that has been brought to a rolling boil for a minimum of 30 seconds is also safe to drink (once it has cooled some).  Commercially purchased milk products that are boxed (may be on store shelves) or refrigerated, are pasteurized and therefore safe to drink as long as they are refrigerated after opening.  Juices that are prepared commercially (in boxes or individual bottles) are also safe to drink as they are pasteurized as well.  Avoid road-side juice vendors as the juice may be diluted with contaminated water or produced from unclean fruit.  Regarding food precautions, you want to make sure that meats are well cooked.   Avoid eating fresh fruits and vegetables raw with the skin intact.  Peel before eating.  Avoid salads due to possible contamination by contaminated water.  Avoid any unpasteurized cheeses (they typically are very white in appearance)    ** We recommend that you use insect repellant to help you prevent infections caused by biting insects such as mosquitoes, flies,  "ticks, fleas and chiggers.  This includes protection against Zika virus, Dengue virus, Chikungunya and Malaria, just to name a few.    For insect repellents that are applied directly to the skin, we recommend DEET containing repellants with a percentage between 20-40%.  Apply to skin exposed surfaces only, every 6-8 hours throughout the day.  I typically recommend applying before breakfast, lunch and dinner as these are natural breaks in your day.  Wash your hands after applying. Apply again in the evening.  You must reapply after bathing or swimming as it is washed away with water.  Apply after sunscreen products are applied. DEET containing repellants protect against all concerning insects with the exception of hornets, wasps or bees. Activity against ticks only lasts for 2 hours. For additional Tick precautions while hiking, tuck your pant legs into your socks as this will prevent ticks from crawling up your shoes / socks onto your legs while walking. Perform a \"tick check\" at least once daily, at the end of your day. PICARDIN containing insect repellants are another option.  Check in the sporting goods areas of most stores for these products.  A recommended alternative, Picardin ,may also be used.  If you are unable to locate the product in stores, try on-line stores as well.      PERMETHRIN SPRAY  is an additional option and is for application to clothing and garments only.  This can be applied to hats, bandanas, socks, boots and any clothing items to prevent insect attention.  I can be applied before you leave and will remain active through many washes and for up to 6 weeks in unwashed clothing.  Read any packaging for full specifications. Apply in an open area as when wet, it has an odor. Once dry, it typically has no odor and does not stain clothing. Regular repellants should be applied to exposed skin however.  Permethrin may only be available on-line.     **  As a general safety procedure, we recommend " that you scan a copy of your passport, any travel required documents (yellow fever vaccine card), and itinerary and email them to yourself.  Keep these in a special travel folder for reference in the event that your travel documents are misplaced or stolen while abroad.  You should also leave a detailed copy of your itinerary with a friend or relative at home for reference as well.  If traveling for long periods, an international SIM card or global plan for your cellular service may be beneficial for communication. This list is not meant to be all inclusive.      **  Please review and understand any cultural aspects of the countries that you will be visiting to ensure that appropriate dress and behaviors are understood.  ENJOY YOUR TRIP (o:       Make sure to come back to complete any vaccine series that may have been started today.  This will ensure full vaccine efficacy and protection for future travel.

## 2025-05-14 DIAGNOSIS — Z82.49 FAMILY HISTORY OF PREMATURE CORONARY ARTERY DISEASE: ICD-10-CM

## 2025-05-14 DIAGNOSIS — R07.9 CHEST PAIN, UNSPECIFIED TYPE: ICD-10-CM

## 2025-05-14 RX ORDER — ASPIRIN 81 MG/1
81 TABLET ORAL DAILY
Qty: 90 TABLET | Refills: 1 | Status: SHIPPED | OUTPATIENT
Start: 2025-05-14

## 2025-05-14 NOTE — TELEPHONE ENCOUNTER
Rx Refill Request Telephone Encounter    Name:  Alphonso King  : 1970     Medication Name:  aspirin 81 mg EC tablet [[id number]]    Order Details  Dose: 81 mg Route: oral Frequency: Daily   Dispense Quantity: 90 tablet (90 day supply) Refills: 1    Duration: -- Dispense As Written: No          Sig: TAKE 1 TABLET BY MOUTH ONCE DAILY.       ALLERGIES:   no known    Specific Pharmacy location:    [facility]/pharmacy #[id number] [address] [address] AT [facility] [address] [place] [address]     Date of last appointment:  25  Date of next appointment:  26    Best number to reach patient:  [phone]     Patient was calling asking if he could get a refill of his Aspirin 81 mg because he will be out of town traveling foe a few weeks starting on Saturday.

## 2025-05-23 DIAGNOSIS — M15.9 OSTEOARTHRITIS OF MULTIPLE JOINTS, UNSPECIFIED OSTEOARTHRITIS TYPE: ICD-10-CM

## 2025-05-23 RX ORDER — CELECOXIB 200 MG/1
200 CAPSULE ORAL 2 TIMES DAILY
Qty: 180 CAPSULE | Refills: 1 | Status: SHIPPED | OUTPATIENT
Start: 2025-05-23

## 2025-07-09 DIAGNOSIS — F41.9 ANXIETY DISORDER, UNSPECIFIED: ICD-10-CM

## 2025-07-09 DIAGNOSIS — F32.A DEPRESSION, UNSPECIFIED: ICD-10-CM

## 2025-07-09 RX ORDER — BUSPIRONE HYDROCHLORIDE 10 MG/1
TABLET ORAL
Qty: 180 TABLET | Refills: 2 | Status: SHIPPED | OUTPATIENT
Start: 2025-07-09

## 2025-07-09 NOTE — TELEPHONE ENCOUNTER
Patient is calling to request a RX refill for his Buspirone 10 mg    Rx Refill Request Telephone Encounter    Name:  Alphonso King  : 1970     Medication Name:  Buspirone   Dose (Optional):    10 mg  Quantity (Optional):    180  Directions (Optional):   Take 1 to 2 tablets by mouth daily     ALLERGIES:   nkda     Specific Pharmacy location:  [facility]     Date of last appointment:  25  Date of next appointment:  26    Best number to reach patient:  [phone]

## 2025-07-10 LAB
ALBUMIN SERPL-MCNC: 4.6 G/DL (ref 3.6–5.1)
ALP SERPL-CCNC: 47 U/L (ref 35–144)
ALT SERPL-CCNC: 20 U/L (ref 9–46)
ANION GAP SERPL CALCULATED.4IONS-SCNC: 11 MMOL/L (CALC) (ref 7–17)
AST SERPL-CCNC: 20 U/L (ref 10–35)
BILIRUB SERPL-MCNC: 0.3 MG/DL (ref 0.2–1.2)
BUN SERPL-MCNC: 19 MG/DL (ref 7–25)
CALCIUM SERPL-MCNC: 9 MG/DL (ref 8.6–10.3)
CHLORIDE SERPL-SCNC: 103 MMOL/L (ref 98–110)
CHOLEST SERPL-MCNC: 148 MG/DL
CHOLEST/HDLC SERPL: 3.5 (CALC)
CO2 SERPL-SCNC: 25 MMOL/L (ref 20–32)
CREAT SERPL-MCNC: 1.01 MG/DL (ref 0.7–1.3)
EGFRCR SERPLBLD CKD-EPI 2021: 88 ML/MIN/1.73M2
ERYTHROCYTE [DISTWIDTH] IN BLOOD BY AUTOMATED COUNT: 13.8 % (ref 11–15)
GLUCOSE SERPL-MCNC: 89 MG/DL (ref 65–99)
HCT VFR BLD AUTO: 46.2 % (ref 38.5–50)
HDLC SERPL-MCNC: 42 MG/DL
HGB BLD-MCNC: 14.7 G/DL (ref 13.2–17.1)
LDLC SERPL CALC-MCNC: 60 MG/DL (CALC)
MCH RBC QN AUTO: 29.3 PG (ref 27–33)
MCHC RBC AUTO-ENTMCNC: 31.8 G/DL (ref 32–36)
MCV RBC AUTO: 92 FL (ref 80–100)
NONHDLC SERPL-MCNC: 106 MG/DL (CALC)
PLATELET # BLD AUTO: 225 THOUSAND/UL (ref 140–400)
PMV BLD REES-ECKER: 9.3 FL (ref 7.5–12.5)
POTASSIUM SERPL-SCNC: 4.2 MMOL/L (ref 3.5–5.3)
PROT SERPL-MCNC: 6.8 G/DL (ref 6.1–8.1)
RBC # BLD AUTO: 5.02 MILLION/UL (ref 4.2–5.8)
SODIUM SERPL-SCNC: 139 MMOL/L (ref 135–146)
TRIGL SERPL-MCNC: 381 MG/DL
WBC # BLD AUTO: 7.8 THOUSAND/UL (ref 3.8–10.8)

## 2025-07-14 ENCOUNTER — TELEPHONE (OUTPATIENT)
Dept: CARDIOLOGY | Facility: CLINIC | Age: 55
End: 2025-07-14
Payer: COMMERCIAL

## 2025-07-14 NOTE — TELEPHONE ENCOUNTER
----- Message from Todd Wynn sent at 7/11/2025  5:03 PM EDT -----  Labs reviewed and look fine.  Very similar to previous results.  Nothing that requires immediate attention.  Continue same and follow-up as scheduled.  Thanks.  ----- Message -----  From: [facility] Results In  Sent: 7/10/2025   9:31 PM EDT  To: Todd Wynn MD

## 2025-07-30 ENCOUNTER — APPOINTMENT (OUTPATIENT)
Dept: CARDIOLOGY | Facility: CLINIC | Age: 55
End: 2025-07-30
Payer: COMMERCIAL

## 2025-07-30 VITALS
SYSTOLIC BLOOD PRESSURE: 105 MMHG | HEART RATE: 82 BPM | DIASTOLIC BLOOD PRESSURE: 67 MMHG | BODY MASS INDEX: 31.45 KG/M2 | WEIGHT: 219.2 LBS

## 2025-07-30 DIAGNOSIS — E78.2 MIXED HYPERLIPIDEMIA: ICD-10-CM

## 2025-07-30 DIAGNOSIS — R07.9 CHEST PAIN, UNSPECIFIED TYPE: ICD-10-CM

## 2025-07-30 DIAGNOSIS — Z82.49 FAMILY HISTORY OF PREMATURE CORONARY ARTERY DISEASE: ICD-10-CM

## 2025-07-30 DIAGNOSIS — R06.02 SHORTNESS OF BREATH: ICD-10-CM

## 2025-07-30 DIAGNOSIS — R07.89 CHEST DISCOMFORT: ICD-10-CM

## 2025-07-30 DIAGNOSIS — R93.1 ABNORMAL CARDIAC CT ANGIOGRAPHY: ICD-10-CM

## 2025-07-30 DIAGNOSIS — I10 ESSENTIAL HYPERTENSION: ICD-10-CM

## 2025-07-30 DIAGNOSIS — I25.10 ATHEROSCLEROSIS OF NATIVE CORONARY ARTERY OF NATIVE HEART WITHOUT ANGINA PECTORIS: ICD-10-CM

## 2025-07-30 PROCEDURE — 3078F DIAST BP <80 MM HG: CPT | Performed by: INTERNAL MEDICINE

## 2025-07-30 PROCEDURE — 3074F SYST BP LT 130 MM HG: CPT | Performed by: INTERNAL MEDICINE

## 2025-07-30 PROCEDURE — 99214 OFFICE O/P EST MOD 30 MIN: CPT | Performed by: INTERNAL MEDICINE

## 2025-07-30 RX ORDER — METOPROLOL SUCCINATE 25 MG/1
25 TABLET, EXTENDED RELEASE ORAL DAILY
Qty: 90 TABLET | Refills: 3 | Status: SHIPPED | OUTPATIENT
Start: 2025-07-30 | End: 2026-07-30

## 2025-07-30 NOTE — PROGRESS NOTES
Patient:  Alphonso King  YOB: 1970  MRN: [id number]     Chief Complaint   Patient presents with    Follow-up     6 month follow up         HPI:       Alphonso King is a 54 y.o. male who returns today for cardiac follow-up.   He does not have any history of atherosclerotic heart or valvular heart disease.  He has a history of significant mixed hyperlipidemia.  No history of hypertension or diabetes mellitus. He stopped smoking tobacco several years ago.  He relates a strongly positive family history for premature coronary artery disease.  His father had a myocardial infarction in his 40s.  His paternal grandparents had myocardial infarctions at relatively young ages.  He works as an /educator at [employer]      He had an episode of chest pain on October 31, 2023.  He was awakened with a strong pressure-like discomfort.  After several minutes he was able to fall back asleep. A few hours later he woke up and still noted nonradiating pressure discomfort in his left upper chest.  No associated nausea, vomiting, diaphoresis, or shortness of breath.  He had been under quite a bit of stress at home.  His symptoms resolved spontaneously.  He was evaluated in the emergency department.  Cardiac enzymes were negative.  D-dimer was negative.  CBC and CMP were normal. Cholesterol was 263 with triglycerides 430, and HDL 41.  LDL unable to be calculated. EKG did not show any significant ST changes.  Chest x-ray did not show any active disease.       He underwent a coronary CT angiogram on December 12, 2023.  The study showed an extensive amount of noncalcified plaque.  There was greater than 70% stenosis of the proximal LAD.  FFR was 0.64.  There was extensive outward remodeling of the plaque at the site of the stenosis.  There was 50% stenosis of the mid right PDA. There was otherwise mild diffuse coronary artery disease. Interestingly, his coronary artery calcium score was 0.  Cardiac  cath by Dr. Mojica on December 26, 2023 showed 80% stenosis of the proximal LAD, less than 10% stenosis of the circumflex, and 10 to 30% stenosis of the RCA.  LV ejection fraction 50%.  He underwent angioplasty and stenting of the LAD.      He was re-evaluated in the emergency department January 4, 2024 for some mild chest pressure.  Initial blood pressure was 158/90 mmHg.  Vital signs otherwise stable.  CBC and CMP normal except for sodium 134.  BNP was 22.  High-sensitivity troponin levels 5 and 6.  No significant ST changes.  He was started on Prilosec 40 mg nightly and his symptoms resolved.     He has been doing reasonably well since his last visit.  He does note some persistent mild fatigue symptoms.  For the most part he is very compliant with using CPAP.  He denies any chest pain or shortness of breath.  He denies any orthopnea, PND, or increasing peripheral edema.  He denies any palpitations, lightheadedness, near-syncope, or syncope.  He denies any fever, chills, or cough.  He denies any nausea, vomiting, or diaphoresis.  He denies any hemoptysis, hematemesis, melena, or hematochezia.  He plans to start cardiac rehab.Lab studies July 10, 2025 showed a normal CBC and CMP.  Cholesterol 148 with HDL 42, triglycerides 3 1, and LDL 60.  Previous triglyceride level was 430.  His PSA was normal.  He is currently free of any anginal or CHF symptoms.  His blood pressures are well-controlled. Continue aspirin, Toprol-XL, benazepril, and rosuvastatin.  I did advise him to decrease his current dose of Toprol-XL to 25 mg daily.  Other details as noted below.      Objective:     Vitals:    07/30/25 1433   BP: 105/67   Pulse: 82         Wt Readings from Last 4 Encounters:   04/02/25 97.1 kg (214 lb)   01/29/25 98.9 kg (218 lb)   09/04/24 97.5 kg (215 lb)   07/30/24 97.6 kg (215 lb 3.2 oz)       Allergies:     No Known Allergies       Medications:     Current Outpatient Medications   Medication Instructions     aspirin 81 mg, oral, Daily    benazepril (LOTENSIN) 30 mg, oral, Daily    busPIRone (Buspar) 10 mg tablet TAKE 1 TO 2 TABLETS BY MOUTH DAILY    celecoxib (CELEBREX) 200 mg, oral, 2 times daily    loperamide (Imodium) 2 mg capsule Take 2 capsules (4mg) as first dose. Take once capsule after each loose stool. Take no more than 8 capsules in 24 hours.    methylPREDNISolone (Medrol Dospak) 4 mg tablets Take as directed on package.    metoprolol succinate XL (TOPROL-XL) 50 mg, oral, Daily, Do not crush or chew.    nitroglycerin (NITROSTAT) 0.4 mg, sublingual, Every 5 min PRN    pantoprazole (PROTONIX) 40 mg, oral, Daily before breakfast    rosuvastatin (CRESTOR) 40 mg, oral, Daily    venlafaxine XR (EFFEXOR-XR) 150 mg, oral, Daily, Do not crush or chew.       Physical Examination:   GENERAL:  Well developed, well nourished, in no acute distress.  CHEST:  Symmetric and nontender.  NEURO/PSYCH:  Alert and oriented times three with approppriate behavior and responses.  NECK:  Supple, no JVD, no bruit.  LUNGS:  Clear to auscultation bilaterally, normal respiratory effort.  HEART:  Rate and rhythm regular with no evident murmur, no gallop appreciated.        There are no rubs, clicks or heaves.  EXTREMITIES:  Warm with good color, no clubbing or cyanosis.  There is no edema noted.  PERIPHERAL VASCULAR:  Pulses present and equally palpable; 2+ throughout.      Lab:     CBC:   Lab Results   Component Value Date    WBC 7.8 07/10/2025    RBC 5.02 07/10/2025    HGB 14.7 07/10/2025    HCT 46.2 07/10/2025     07/10/2025        CMP:    Lab Results   Component Value Date     07/10/2025    K 4.2 07/10/2025     07/10/2025    CO2 25 07/10/2025    BUN 19 07/10/2025    CREATININE 1.01 07/10/2025    GLUCOSE 89 07/10/2025    CALCIUM 9.0 07/10/2025       Lipid Profile:    Lab Results   Component Value Date    TRIG 381 (H) 07/10/2025    HDL 42 07/10/2025    LDLCALC 60 07/10/2025       BMP:  Lab Results   Component Value Date      07/10/2025     01/08/2025     (L) 01/04/2024     12/26/2023    K 4.2 07/10/2025    K 4.4 01/08/2025    K 4.0 01/04/2024    K 3.6 12/26/2023     07/10/2025     01/08/2025     01/04/2024     12/26/2023    CO2 25 07/10/2025    CO2 29 01/08/2025    CO2 23 01/04/2024    CO2 25 12/26/2023    BUN 19 07/10/2025    BUN 22 01/08/2025    BUN 18 01/04/2024    BUN 22 12/26/2023    CREATININE 1.01 07/10/2025    CREATININE 1.24 01/08/2025    CREATININE 1.16 01/04/2024    CREATININE 1.21 12/26/2023       CBC:  Lab Results   Component Value Date    WBC 7.8 07/10/2025    WBC 8.0 01/08/2025    WBC 8.3 01/04/2024    WBC 7.2 12/26/2023    RBC 5.02 07/10/2025    RBC 5.16 01/08/2025    RBC 5.28 01/04/2024    RBC 4.61 12/26/2023    HGB 14.7 07/10/2025    HGB 15.2 01/08/2025    HGB 16.0 01/04/2024    HGB 13.9 12/26/2023    HCT 46.2 07/10/2025    HCT 47.1 01/08/2025    HCT 47.6 01/04/2024    HCT 40.9 (L) 12/26/2023    MCV 92.0 07/10/2025    MCV 91 01/08/2025    MCV 90 01/04/2024    MCV 89 12/26/2023    MCH 29.3 07/10/2025    MCH 29.5 01/08/2025    MCH 30.3 01/04/2024    MCH 30.2 12/26/2023    MCHC 31.8 (L) 07/10/2025    MCHC 32.3 01/08/2025    MCHC 33.6 01/04/2024    MCHC 34.0 12/26/2023    RDW 13.8 07/10/2025    RDW 13.5 01/08/2025    RDW 13.4 01/04/2024    RDW 13.4 12/26/2023     07/10/2025     01/08/2025     01/04/2024     12/26/2023    MPV 9.3 07/10/2025    MPV 9.2 10/31/2023       Hepatic Function Panel:    Lab Results   Component Value Date    ALKPHOS 47 07/10/2025    ALT 20 07/10/2025    AST 20 07/10/2025    PROT 6.8 07/10/2025    BILITOT 0.3 07/10/2025     Mother  Magnesium:    Lab Results   Component Value Date    MG 2.43 (H) 01/04/2024       BNP:   Lab Results   Component Value Date    BNP 22 01/04/2024        Cardiac Enzymes:    Lab Results   Component Value Date    TROPHS 5 01/04/2024    TROPHS 6 01/04/2024    TROPHS 4 10/31/2023       Diagnostic  Studies:     XR chest 1 view  Result Date: 1/4/2024    No active disease in the chest.     Signed by: Vickey Meyers 1/4/2024 11:38 AM Dictation workstation:   [id number]      ECG 12 lead  Result Date: 1/4/2024    Normal sinus rhythm Normal ECG When compared with ECG of 04-JAN-2024 08:05, (unconfirmed) Nonspecific T wave abnormality no longer evident in Lateral leads See ED provider note for full interpretation and clinical correlation Confirmed by Victor Hugo Dominguez ([id number]) on 1/4/2024 11:11:56 AM      Problem List:     Patient Active Problem List   Diagnosis    Achilles tendon pain    Anxiety and depression    History of melanoma    Mixed hyperlipidemia    Lump of skin of back    Pain of right upper extremity    Sebaceous cyst    Tendinitis of ankle    Tendinitis of elbow    Tendonitis    Chest pain    Shortness of breath    Family history of premature coronary artery disease    Chest discomfort    Abnormal cardiac CT angiography    BMI 30.0-30.9,adult    Former smoker    Essential hypertension    Class 1 obesity due to excess calories with serious comorbidity and body mass index (BMI) of 31.0 to 31.9 in adult    Atherosclerosis of native coronary artery of native heart without angina pectoris    Hx of percutaneous transluminal coronary angioplasty       Asessment:     Problem List Items Addressed This Visit           ICD-10-CM    Mixed hyperlipidemia E78.2    Relevant Medications    metoprolol succinate XL (Toprol-XL) 25 mg 24 hr tablet    Other Relevant Orders    Follow Up In Cardiology    CBC    Comprehensive metabolic panel    Lipid panel    Chest pain R07.9    Relevant Orders    Follow Up In Cardiology    Shortness of breath R06.02    Relevant Medications    metoprolol succinate XL (Toprol-XL) 25 mg 24 hr tablet    Other Relevant Orders    Follow Up In Cardiology    Family history of premature coronary artery disease Z82.49    Relevant Orders    Follow Up In Cardiology    Chest discomfort R07.89    Relevant  Orders    Follow Up In Cardiology    Abnormal cardiac CT angiography R93.1    Relevant Orders    Follow Up In Cardiology    Essential hypertension I10    Relevant Medications    metoprolol succinate XL (Toprol-XL) 25 mg 24 hr tablet    Other Relevant Orders    Follow Up In Cardiology    CBC    Comprehensive metabolic panel    Lipid panel    Atherosclerosis of native coronary artery of native heart without angina pectoris I25.10    Relevant Medications    metoprolol succinate XL (Toprol-XL) 25 mg 24 hr tablet    Other Relevant Orders    Follow Up In Cardiology    CBC    Comprehensive metabolic panel    Lipid panel     Scribe Attestation  By signing my name below, I, Kranthi Clinton LPNibbritt   attest that this documentation has been prepared under the direction and in the presence of Todd Wynn MD.     Provider Attestation - Scribe documentation    The above portion of this note was dictated by me using voice recognition software.  All medical record entries made by the scribe were at my direction.  The scribe entering the documentation was in my presence.   I have reviewed the chart and agree that the record accurately reflects my personal performance of the history, physical exam, discussion and plan.

## 2025-07-30 NOTE — PATIENT INSTRUCTIONS
Continue same medications and treatments.     Please bring any lab results from other providers / physicians to your next appointment.     Please bring all medicines, vitamins, and herbal supplements with you when you come to the office.     Prescriptions will not be filled unless you are compliant with your follow up appointments or have a follow up appointment scheduled as per instruction of your physician. Refills should be requested at the time of your visit.      DID YOU KNOW:  We have a pharmacy here in the [facility].  They can fill all prescriptions, not just cardiac medications.  Prescriptions from other pharmacies can easily be transferred to the  pharmacy by the  pharmacist on site.   pharmacies offer FREE HOME DELIVERY on medications to anywhere in [place]. They can sync your medications. Typically prescriptions can be ready in 10 - 15 minutes. If pharmacy is unable to fill your  prescription or if cost is more than your paying now the Pharmacist can easily transfer back to your Pharmacy of choice. Pharmacy phone # [phone].

## 2025-08-28 DIAGNOSIS — E78.2 MIXED HYPERLIPIDEMIA: ICD-10-CM

## 2025-08-28 RX ORDER — ROSUVASTATIN CALCIUM 40 MG/1
40 TABLET, COATED ORAL DAILY
Qty: 90 TABLET | Refills: 3 | Status: SHIPPED | OUTPATIENT
Start: 2025-08-28 | End: 2026-08-28

## 2026-01-28 ENCOUNTER — APPOINTMENT (OUTPATIENT)
Dept: CARDIOLOGY | Facility: CLINIC | Age: 56
End: 2026-01-28
Payer: COMMERCIAL

## 2026-04-01 ENCOUNTER — APPOINTMENT (OUTPATIENT)
Dept: PRIMARY CARE | Facility: CLINIC | Age: 56
End: 2026-04-01
Payer: COMMERCIAL

## (undated) DEVICE — CLOSURE SYSTEM, VASCULAR, VASCADE 6/7F VCS

## (undated) DEVICE — SHEATH, PINNACLE, W/.038 GW 10CM, 5FR INTRODUCER, 2.5 CM DIALATOR

## (undated) DEVICE — SHEATH, PINNACLE, W/.038 GUIDEWIRE, 10 CM,  6FR INTRODUCER, 6FR DIA, 2.5 CM DIALATOR

## (undated) DEVICE — CATHETER, GUIDING, VISTA BRITE 6FR, XB LAD 3.5 100CM

## (undated) DEVICE — CATHETER, INFINITI DIAGNOSTIC, 5 FR 100CM 3DRC, WILLIAMS RIGHT OR NO TORQUE

## (undated) DEVICE — INFLATION DEVICE, COPILOT VALVE AND 20/30 INDEFLATOR

## (undated) DEVICE — GUIDEWIRE, UNIVERSAL BALANCE MID WEIGHT, 190CM, STR

## (undated) DEVICE — CATHETER, BALLOON DILATION, EUPHORA SEMICOMPLIANT 3.00  X 15 MM X 142CM

## (undated) DEVICE — CATHETER, DIAGNOSTIC, 5FR,  PIG-145, 110CM, 6SH ANGLED

## (undated) DEVICE — BANDAGE, QUIKCLOT, INTERVENTIONAL HEMO, W/O SLIT

## (undated) DEVICE — CATHETER, DIAGNOSTIC, JUDKINS, LEFT, 5 FR-JL 3.5